# Patient Record
Sex: FEMALE | Race: WHITE | NOT HISPANIC OR LATINO | Employment: FULL TIME | URBAN - METROPOLITAN AREA
[De-identification: names, ages, dates, MRNs, and addresses within clinical notes are randomized per-mention and may not be internally consistent; named-entity substitution may affect disease eponyms.]

---

## 2017-06-20 ENCOUNTER — ALLSCRIPTS OFFICE VISIT (OUTPATIENT)
Dept: OTHER | Facility: OTHER | Age: 37
End: 2017-06-20

## 2017-06-20 DIAGNOSIS — R53.83 OTHER FATIGUE: ICD-10-CM

## 2017-06-20 DIAGNOSIS — M54.16 RADICULOPATHY OF LUMBAR REGION: ICD-10-CM

## 2017-06-20 DIAGNOSIS — M54.9 DORSALGIA: ICD-10-CM

## 2017-06-20 DIAGNOSIS — Z13.6 ENCOUNTER FOR SCREENING FOR CARDIOVASCULAR DISORDERS: ICD-10-CM

## 2017-07-25 ENCOUNTER — TRANSCRIBE ORDERS (OUTPATIENT)
Dept: ADMINISTRATIVE | Facility: HOSPITAL | Age: 37
End: 2017-07-25

## 2017-07-25 ENCOUNTER — APPOINTMENT (OUTPATIENT)
Dept: LAB | Facility: HOSPITAL | Age: 37
End: 2017-07-25
Payer: COMMERCIAL

## 2017-07-25 DIAGNOSIS — R53.83 OTHER FATIGUE: ICD-10-CM

## 2017-07-25 DIAGNOSIS — Z13.6 ENCOUNTER FOR SCREENING FOR CARDIOVASCULAR DISORDERS: ICD-10-CM

## 2017-07-25 LAB
ALBUMIN SERPL BCP-MCNC: 3.9 G/DL (ref 3.5–5)
ALP SERPL-CCNC: 74 U/L (ref 46–116)
ALT SERPL W P-5'-P-CCNC: 33 U/L (ref 12–78)
ANION GAP SERPL CALCULATED.3IONS-SCNC: 7 MMOL/L (ref 4–13)
AST SERPL W P-5'-P-CCNC: 16 U/L (ref 5–45)
BASOPHILS # BLD AUTO: 0.03 THOUSANDS/ΜL (ref 0–0.1)
BASOPHILS NFR BLD AUTO: 0 % (ref 0–1)
BILIRUB SERPL-MCNC: 0.6 MG/DL (ref 0.2–1)
BUN SERPL-MCNC: 13 MG/DL (ref 5–25)
CALCIUM SERPL-MCNC: 9 MG/DL (ref 8.3–10.1)
CHLORIDE SERPL-SCNC: 103 MMOL/L (ref 100–108)
CHOLEST SERPL-MCNC: 196 MG/DL (ref 50–200)
CO2 SERPL-SCNC: 30 MMOL/L (ref 21–32)
CREAT SERPL-MCNC: 0.78 MG/DL (ref 0.6–1.3)
EOSINOPHIL # BLD AUTO: 0.07 THOUSAND/ΜL (ref 0–0.61)
EOSINOPHIL NFR BLD AUTO: 1 % (ref 0–6)
ERYTHROCYTE [DISTWIDTH] IN BLOOD BY AUTOMATED COUNT: 12.8 % (ref 11.6–15.1)
GFR SERPL CREATININE-BSD FRML MDRD: 97 ML/MIN/1.73SQ M
GLUCOSE P FAST SERPL-MCNC: 97 MG/DL (ref 65–99)
HCT VFR BLD AUTO: 43.1 % (ref 34.8–46.1)
HDLC SERPL-MCNC: 57 MG/DL (ref 40–60)
HGB BLD-MCNC: 14.1 G/DL (ref 11.5–15.4)
LDLC SERPL CALC-MCNC: 116 MG/DL (ref 0–100)
LYMPHOCYTES # BLD AUTO: 2.02 THOUSANDS/ΜL (ref 0.6–4.47)
LYMPHOCYTES NFR BLD AUTO: 26 % (ref 14–44)
MCH RBC QN AUTO: 30.5 PG (ref 26.8–34.3)
MCHC RBC AUTO-ENTMCNC: 32.7 G/DL (ref 31.4–37.4)
MCV RBC AUTO: 93 FL (ref 82–98)
MONOCYTES # BLD AUTO: 0.45 THOUSAND/ΜL (ref 0.17–1.22)
MONOCYTES NFR BLD AUTO: 6 % (ref 4–12)
NEUTROPHILS # BLD AUTO: 5.18 THOUSANDS/ΜL (ref 1.85–7.62)
NEUTS SEG NFR BLD AUTO: 67 % (ref 43–75)
NRBC BLD AUTO-RTO: 0 /100 WBCS
PLATELET # BLD AUTO: 274 THOUSANDS/UL (ref 149–390)
PMV BLD AUTO: 10.4 FL (ref 8.9–12.7)
POTASSIUM SERPL-SCNC: 5.2 MMOL/L (ref 3.5–5.3)
PROT SERPL-MCNC: 7.1 G/DL (ref 6.4–8.2)
RBC # BLD AUTO: 4.63 MILLION/UL (ref 3.81–5.12)
SODIUM SERPL-SCNC: 140 MMOL/L (ref 136–145)
T4 FREE SERPL-MCNC: 0.99 NG/DL (ref 0.76–1.46)
TRIGL SERPL-MCNC: 114 MG/DL
TSH SERPL DL<=0.05 MIU/L-ACNC: 1.41 UIU/ML (ref 0.36–3.74)
WBC # BLD AUTO: 7.78 THOUSAND/UL (ref 4.31–10.16)

## 2017-07-25 PROCEDURE — 84439 ASSAY OF FREE THYROXINE: CPT

## 2017-07-25 PROCEDURE — 84443 ASSAY THYROID STIM HORMONE: CPT

## 2017-07-25 PROCEDURE — 80061 LIPID PANEL: CPT

## 2017-07-25 PROCEDURE — 36415 COLL VENOUS BLD VENIPUNCTURE: CPT

## 2017-07-25 PROCEDURE — 80053 COMPREHEN METABOLIC PANEL: CPT

## 2017-07-25 PROCEDURE — 85025 COMPLETE CBC W/AUTO DIFF WBC: CPT

## 2017-07-26 ENCOUNTER — ALLSCRIPTS OFFICE VISIT (OUTPATIENT)
Dept: OTHER | Facility: OTHER | Age: 37
End: 2017-07-26

## 2017-08-01 ENCOUNTER — GENERIC CONVERSION - ENCOUNTER (OUTPATIENT)
Dept: OTHER | Facility: OTHER | Age: 37
End: 2017-08-01

## 2017-08-28 ENCOUNTER — ALLSCRIPTS OFFICE VISIT (OUTPATIENT)
Dept: OTHER | Facility: OTHER | Age: 37
End: 2017-08-28

## 2017-10-06 ENCOUNTER — ALLSCRIPTS OFFICE VISIT (OUTPATIENT)
Dept: OTHER | Facility: OTHER | Age: 37
End: 2017-10-06

## 2017-10-06 ENCOUNTER — TRANSCRIBE ORDERS (OUTPATIENT)
Dept: ADMINISTRATIVE | Facility: HOSPITAL | Age: 37
End: 2017-10-06

## 2017-10-06 DIAGNOSIS — M54.50 LOW BACK PAIN WITHOUT SCIATICA, UNSPECIFIED BACK PAIN LATERALITY, UNSPECIFIED CHRONICITY: Primary | ICD-10-CM

## 2017-10-06 DIAGNOSIS — M54.16 RADICULOPATHY OF LUMBAR REGION: ICD-10-CM
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
## 2017-10-12 ENCOUNTER — HOSPITAL ENCOUNTER (OUTPATIENT)
Dept: MRI IMAGING | Facility: CLINIC | Age: 37
Discharge: HOME | End: 2017-10-12
Payer: COMMERCIAL

## 2017-10-12 DIAGNOSIS — M54.16 RADICULOPATHY OF LUMBAR REGION: ICD-10-CM

## 2017-10-12 PROCEDURE — 72148 MRI LUMBAR SPINE W/O DYE: CPT

## 2017-10-30 ENCOUNTER — GENERIC CONVERSION - ENCOUNTER (OUTPATIENT)
Dept: OTHER | Facility: OTHER | Age: 37
End: 2017-10-30

## 2017-11-08 ENCOUNTER — GENERIC CONVERSION - ENCOUNTER (OUTPATIENT)
Dept: OTHER | Facility: OTHER | Age: 37
End: 2017-11-08

## 2017-11-14 ENCOUNTER — ALLSCRIPTS OFFICE VISIT (OUTPATIENT)
Dept: RADIOLOGY | Facility: CLINIC | Age: 37
End: 2017-11-14
Payer: COMMERCIAL

## 2017-12-06 ENCOUNTER — GENERIC CONVERSION - ENCOUNTER (OUTPATIENT)
Dept: OTHER | Facility: OTHER | Age: 37
End: 2017-12-06

## 2018-01-11 NOTE — MISCELLANEOUS
Date: 08/22/2017   Dear Margarito Booker:     We have attempted to reach you regarding your upcoming appointment on September 11, 2017 and with Dr Kenyon Alexander     Unfortunately, due to a change in the provider's schedule we need to change your appointment  Please call our office as soon as possible so we can reschedule your appointment  We apologize for any inconvenience this may cause  Thank you in advance for your cooperation and assistance       Sincerely,   [facility]      Signatures   Electronically signed by : Tabitha Alvarez, ; Aug 22 2017  9:08AM EST                       (Author)

## 2018-01-12 VITALS
HEART RATE: 88 BPM | BODY MASS INDEX: 40.95 KG/M2 | HEIGHT: 63 IN | SYSTOLIC BLOOD PRESSURE: 102 MMHG | WEIGHT: 231.13 LBS | DIASTOLIC BLOOD PRESSURE: 80 MMHG | OXYGEN SATURATION: 97 %

## 2018-01-12 VITALS
RESPIRATION RATE: 72 BRPM | HEIGHT: 63 IN | WEIGHT: 239 LBS | DIASTOLIC BLOOD PRESSURE: 80 MMHG | SYSTOLIC BLOOD PRESSURE: 100 MMHG | OXYGEN SATURATION: 98 % | BODY MASS INDEX: 42.35 KG/M2

## 2018-01-13 VITALS
RESPIRATION RATE: 14 BRPM | WEIGHT: 227 LBS | DIASTOLIC BLOOD PRESSURE: 72 MMHG | BODY MASS INDEX: 40.22 KG/M2 | SYSTOLIC BLOOD PRESSURE: 114 MMHG | HEART RATE: 92 BPM | HEIGHT: 63 IN

## 2018-01-14 VITALS
OXYGEN SATURATION: 97 % | TEMPERATURE: 98.6 F | SYSTOLIC BLOOD PRESSURE: 120 MMHG | WEIGHT: 234 LBS | DIASTOLIC BLOOD PRESSURE: 72 MMHG | RESPIRATION RATE: 87 BRPM

## 2018-01-22 VITALS
HEIGHT: 63 IN | RESPIRATION RATE: 14 BRPM | DIASTOLIC BLOOD PRESSURE: 72 MMHG | SYSTOLIC BLOOD PRESSURE: 104 MMHG | BODY MASS INDEX: 40.75 KG/M2 | WEIGHT: 230 LBS | HEART RATE: 84 BPM

## 2018-01-22 VITALS
HEIGHT: 63 IN | DIASTOLIC BLOOD PRESSURE: 82 MMHG | BODY MASS INDEX: 40.04 KG/M2 | SYSTOLIC BLOOD PRESSURE: 128 MMHG | WEIGHT: 226 LBS

## 2018-01-23 NOTE — MISCELLANEOUS
Message   Recorded as Task   Date: 11/21/2017 08:54 AM, Created By: Facundo Vorenato   Task Name: Follow Up   Assigned To: [address] ,Team   Regarding Patient: Caio Patterson, Status: Active   Comment:    Facundo Valdez - 21 Nov 2017 8:54 AM     TASK CREATED  S/p R L4-L5 MBB #1 11/14  Pain diary needed  Facundo Gildarenato - 22 Nov 2017 10:19 AM     TASK IN PROGRESS   Patience Metzger - 22 Nov 2017 10:20 AM     TASK EDITED  lmom to Yulia Giang - 30 Nov 2017 1:10 PM     TASK EDITED  [facility] to Shauna Arauz - 05 Dec 2017 9:46 AM     TASK EDITED  Send can not reach letter   Dinesh Roman - 06 Dec 2017 9:11 AM     TASK EDITED  letter sent        Active Problems    1  Back pain, chronic (724 5,338 29) (M54 9,G89 29)   2  Candidal vulvovaginitis (112 1) (B37 3)   3  Lumbar degenerative disc disease (722 52) (M51 36)   4  Menorrhagia (626 2) (N92 0)   5  Morbid obesity with BMI of 40 0-44 9, adult (278 01,V85 41) (E66 01,Z68 41)   6  PMDD (premenstrual dysphoric disorder) (625 4) (F32 81)   7  Right lumbar radiculopathy (724 4) (M54 16)    Current Meds   1  BD Pen Needle Mini U/F 31G X 5 MM Miscellaneous; Patient injecting 0 6mg daily  Increasing by 0 6mg weekly if tolerated, not to exceed 3mg daily; Therapy: 54Jzz3271 to (Evaluate:76Cbu8602)  Requested for: 41Qjm7238; Last   Rx:01Aug2017 Ordered   2  Fluconazole 150 MG Oral Tablet; diflucan 150mg,,,take 1 today and repeat in 3 days; Therapy: 89BIX5053 to (Evaluate:16Nov2017)  Requested for: 20JXZ2239; Last   Rx:08Nov2017 Ordered   3  Saxenda 18 MG/3ML Subcutaneous Solution Pen-injector; INJECT 0 6 MG Daily for one   week, then increase dose by 0 6 mg/day weekly, no more than 3 mg   daily; Therapy: 91AEY0341 to (Dariusz Suggs)  Requested for: 93Mzg8495 Ordered   4  Sertraline HCl - 25 MG Oral Tablet (Zoloft); One tablet by mouth on days 14-28 of cycle;    Therapy: 04RGP3495 to (Nidia Schilling)  Requested for: 74RHF7140; Last   GN:30ISY2852 Ordered    Allergies    1  No Known Drug Allergies    2   Seasonal    Signatures   Electronically signed by : Sam Ramos, ; Dec  6 2017  9:11AM EST                       (Author)

## 2018-03-07 NOTE — PROCEDURES
Procedure    Indication: Mechanical low back pain  Preoperative diagnosis: 1  Lumbar Spondylosis      2  Low back pain  Postoperative diagnosis: 1  Lumbar Spondylosis   2  Low back pain    Procedure: Fluoroscopically-guided {RIGHT } L4-L5 Medial Branch Nerve/Dorsal Ramus Blocks using 2% lidocaine      After discussing the risks, benefits, and alternatives to the procedure, the patient expressed understanding and wished to proceed  The patient was brought to the fluoroscopy suite and placed in the prone position  Procedural pause conducted to verify: correct patient identity, procedure to be performed and as applicable, correct side and site, correct patient position, and availability of implants, special equipment and special requirements  Using fluoroscopy, the junction of the transverse process and superior articulating process of the {RIGHT } L5-5 and L5-S1 facet levels were identified  The junction between the sacral ala and the SAP was also identified in AP view  The skin was sterilely prepped and draped in the usual fashion using Chloraprep skin prep  Using fluoroscopic guidance, a 3 5 inch 25 gauge spinal needle was advanced to each target  A lateral view was obtained which showed the needles posterior to the foramen  After negative aspiration, 0 5cc of 2% lidocaine was injected at each site and the needles were then removed  The patient tolerated the procedure well and there were no apparent complications  After appropriate observation, the patient was dismissed from the clinic in good condition under their own power  The patient was instructed to keep a pain diary and report the results to our office            Signatures   Electronically signed by : Adonay Rahman MD; Nov 14 2017  4:34PM EST                       (Author)

## 2018-04-11 ENCOUNTER — OFFICE VISIT (OUTPATIENT)
Dept: INTERNAL MEDICINE CLINIC | Facility: CLINIC | Age: 38
End: 2018-04-11
Payer: COMMERCIAL

## 2018-04-11 VITALS
TEMPERATURE: 98.5 F | BODY MASS INDEX: 42.16 KG/M2 | HEART RATE: 96 BPM | DIASTOLIC BLOOD PRESSURE: 68 MMHG | WEIGHT: 238 LBS | OXYGEN SATURATION: 99 % | SYSTOLIC BLOOD PRESSURE: 122 MMHG

## 2018-04-11 DIAGNOSIS — J30.9 ALLERGIC RHINITIS, UNSPECIFIED SEASONALITY, UNSPECIFIED TRIGGER: ICD-10-CM

## 2018-04-11 DIAGNOSIS — J01.40 ACUTE PANSINUSITIS, RECURRENCE NOT SPECIFIED: Primary | ICD-10-CM

## 2018-04-11 PROCEDURE — 99213 OFFICE O/P EST LOW 20 MIN: CPT | Performed by: PHYSICIAN ASSISTANT

## 2018-04-11 RX ORDER — CEFUROXIME AXETIL 250 MG/1
250 TABLET ORAL EVERY 12 HOURS SCHEDULED
Qty: 20 TABLET | Refills: 0 | Status: SHIPPED | OUTPATIENT
Start: 2018-04-11 | End: 2018-04-21

## 2018-04-11 NOTE — PATIENT INSTRUCTIONS
Will start antibiotic due to the length of patient's symptoms  Also advised continuing allergy medication, and trial of OTC nasal spray such as Flonase, Rhinocort, or Nasacort

## 2018-04-11 NOTE — PROGRESS NOTES
Assessment/Plan:     Patient Instructions   Will start antibiotic due to the length of patient's symptoms  Also advised continuing allergy medication, and trial of OTC nasal spray such as Flonase, Rhinocort, or Nasacort  Followup scheduled per orders  Diagnoses and all orders for this visit:    Acute pansinusitis, recurrence not specified  -     cefuroxime (CEFTIN) 250 mg tablet; Take 1 tablet (250 mg total) by mouth every 12 (twelve) hours for 10 days    Allergic rhinitis, unspecified seasonality, unspecified trigger          Subjective:      Patient ID: Tamika Palencia is a 44 y o  female  Acute visit    Patient had been on a short vacation 2 weeks ago, at the end of the vacation she started with a sore throat and a cough  She started allergy medicine of Claritin daily and it seemed to help the cough to some degree  However as time progressed she developed increased postnasal drip, facial pressure and sinus pain along with decreased hearing of the right ear  The symptoms are not improving with the allergy medicines  At this time is not having typical allergy symptoms of itchy watery eyes, runny nose, sneezing, postnasal drip          ALLERGIES:  Allergies   Allergen Reactions    Seasonal Ic  [Cholestatin]        CURRENT MEDICATIONS:    Current Outpatient Prescriptions:     cefuroxime (CEFTIN) 250 mg tablet, Take 1 tablet (250 mg total) by mouth every 12 (twelve) hours for 10 days, Disp: 20 tablet, Rfl: 0    ACTIVE PROBLEM LIST:  Patient Active Problem List   Diagnosis    Acute pansinusitis       PAST MEDICAL HISTORY:  Past Medical History:   Diagnosis Date    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        PAST SURGICAL HISTORY:  Past Surgical History:   Procedure Laterality Date     SECTION      x3    INDUCED       by dilation and evacuation        FAMILY HISTORY:  Family History   Problem Relation Age of Onset    Anxiety disorder Mother     Depression Mother     Uterine cancer Mother     Hypertension Mother     Prostate cancer Father     Osteoporosis Maternal Grandmother     Breast cancer Paternal Grandmother     Alcohol abuse Paternal Grandfather     Schizophrenia Maternal Uncle     Depression Maternal Aunt     Bipolar disorder Maternal Aunt     Anxiety disorder Maternal Aunt     Other Maternal Aunt      epilepsy     Anxiety disorder Maternal Grandfather     Alcohol abuse Maternal Grandfather     Bipolar disorder Maternal Grandfather     Depression Maternal Grandfather        SOCIAL HISTORY:  Social History     Social History    Marital status: /Civil Union     Spouse name: N/A    Number of children: 3    Years of education: N/A     Occupational History    Full time employment - teacher       Social History Main Topics    Smoking status: Former Smoker    Smokeless tobacco: Never Used      Comment: 1/2-1 ppd x 10 years     Alcohol use Yes      Comment: socially - 1-2 glasses of wine weekly     Drug use: No    Sexual activity: Yes     Other Topics Concern    Not on file     Social History Narrative    Activities: Hiking & Reading     Brushes teeth twice a day     Dental care, regularly     Exercises regularly            Review of Systems   Constitutional: Negative for activity change, chills, fatigue and fever  HENT: Positive for congestion, ear pain, hearing loss, postnasal drip, rhinorrhea, sinus pain and sinus pressure  Eyes: Negative for discharge  Respiratory: Positive for cough  Negative for chest tightness, shortness of breath and wheezing  Cardiovascular: Negative for chest pain, palpitations and leg swelling  Gastrointestinal: Negative for abdominal pain, diarrhea, nausea and vomiting  Genitourinary: Negative for difficulty urinating  Musculoskeletal: Negative for arthralgias and myalgias  Skin: Negative for rash  Allergic/Immunologic: Negative for immunocompromised state     Neurological: Positive for headaches  Negative for dizziness, syncope, weakness and light-headedness  Hematological: Negative for adenopathy  Does not bruise/bleed easily  Psychiatric/Behavioral: Negative for dysphoric mood  The patient is not nervous/anxious  Objective:  Vitals:    04/11/18 1301   BP: 122/68   BP Location: Left arm   Patient Position: Sitting   Pulse: 96   Temp: 98 5 °F (36 9 °C)   SpO2: 99%   Weight: 108 kg (238 lb)        Physical Exam   Constitutional: She is oriented to person, place, and time  She appears well-developed and well-nourished  No distress  HENT:   Allergic shiners present, injected granular conjunctiva, pale boggy nasal mucosa with increased clear mucoid drainage, increased clear postnasal drainage in the posterior pharynx, tender both frontal and maxillary sinuses, right tympanic membrane slightly retracted with increased fluid behind   Neck: Neck supple  Cardiovascular: Normal rate, regular rhythm and normal heart sounds  Pulmonary/Chest: Effort normal and breath sounds normal  No respiratory distress  Musculoskeletal: She exhibits no edema  Lymphadenopathy:     She has no cervical adenopathy  Neurological: She is alert and oriented to person, place, and time  Skin: Skin is warm and dry  No rash noted  Psychiatric: She has a normal mood and affect  Her behavior is normal    Nursing note and vitals reviewed  RESULTS:    No results found for this or any previous visit (from the past 1008 hour(s))

## 2018-04-12 DIAGNOSIS — B37.3 VAGINAL YEAST INFECTION: Primary | ICD-10-CM

## 2018-04-12 RX ORDER — FLUCONAZOLE 150 MG/1
150 TABLET ORAL ONCE
Qty: 2 TABLET | Refills: 0 | Status: SHIPPED | OUTPATIENT
Start: 2018-04-12 | End: 2018-04-12

## 2018-04-12 NOTE — TELEPHONE ENCOUNTER
Pt called stating "I was on an antibiotic & now have a yeast inf "  Was last treated for yeast in November 2017 by [facility]  Per [facility] , ok to order diflucan  Diflucan 150mg po 1 dose now & 1 on day three ordered in epic  Pt advised to call pharmacy prior to picking up med

## 2018-05-18 ENCOUNTER — TELEPHONE (OUTPATIENT)
Dept: OBGYN CLINIC | Facility: CLINIC | Age: 38
End: 2018-05-18

## 2018-05-18 DIAGNOSIS — N39.0 URINARY TRACT INFECTION WITHOUT HEMATURIA, SITE UNSPECIFIED: Primary | ICD-10-CM

## 2018-05-18 RX ORDER — SULFAMETHOXAZOLE AND TRIMETHOPRIM 800; 160 MG/1; MG/1
1 TABLET ORAL EVERY 12 HOURS SCHEDULED
Qty: 6 TABLET | Refills: 0 | Status: SHIPPED | OUTPATIENT
Start: 2018-05-18 | End: 2018-05-21

## 2018-05-18 NOTE — TELEPHONE ENCOUNTER
Spoke with pt, c/o u/a freq and urgency   The patient has a history of in the past  Seen 02/2018 for yrly  nka  Order placed in epic for bactrim ds bid times 3 days  Pharm

## 2018-07-17 ENCOUNTER — TELEPHONE (OUTPATIENT)
Dept: INTERNAL MEDICINE CLINIC | Facility: CLINIC | Age: 38
End: 2018-07-17

## 2018-07-17 DIAGNOSIS — G47.9 SLEEP DISTURBANCE: Primary | ICD-10-CM

## 2018-07-17 PROBLEM — M54.9 BACK PAIN, CHRONIC: Status: ACTIVE | Noted: 2017-06-20

## 2018-07-17 PROBLEM — M51.369 LUMBAR DEGENERATIVE DISC DISEASE: Status: ACTIVE | Noted: 2017-10-30

## 2018-07-17 PROBLEM — M51.36 LUMBAR DEGENERATIVE DISC DISEASE: Status: ACTIVE | Noted: 2017-10-30

## 2018-07-17 PROBLEM — G89.29 BACK PAIN, CHRONIC: Status: ACTIVE | Noted: 2017-06-20

## 2018-07-23 ENCOUNTER — OFFICE VISIT (OUTPATIENT)
Dept: BARIATRICS | Facility: CLINIC | Age: 38
End: 2018-07-23
Payer: COMMERCIAL

## 2018-07-23 VITALS
TEMPERATURE: 98.6 F | HEART RATE: 92 BPM | RESPIRATION RATE: 16 BRPM | DIASTOLIC BLOOD PRESSURE: 68 MMHG | WEIGHT: 243.2 LBS | HEIGHT: 64 IN | BODY MASS INDEX: 41.52 KG/M2 | SYSTOLIC BLOOD PRESSURE: 126 MMHG

## 2018-07-23 DIAGNOSIS — M51.36 LUMBAR DEGENERATIVE DISC DISEASE: ICD-10-CM

## 2018-07-23 DIAGNOSIS — R63.5 ABNORMAL WEIGHT GAIN: ICD-10-CM

## 2018-07-23 DIAGNOSIS — E78.00 ELEVATED LDL CHOLESTEROL LEVEL: ICD-10-CM

## 2018-07-23 DIAGNOSIS — Z91.89 AT RISK FOR SLEEP APNEA: ICD-10-CM

## 2018-07-23 DIAGNOSIS — E66.01 OBESITY, CLASS III, BMI 40-49.9 (MORBID OBESITY) (HCC): Primary | ICD-10-CM

## 2018-07-23 PROBLEM — E66.813 OBESITY, CLASS III, BMI 40-49.9 (MORBID OBESITY): Status: ACTIVE | Noted: 2018-07-23

## 2018-07-23 PROBLEM — J01.40 ACUTE PANSINUSITIS: Status: RESOLVED | Noted: 2018-04-11 | Resolved: 2018-07-23

## 2018-07-23 PROCEDURE — 99204 OFFICE O/P NEW MOD 45 MIN: CPT | Performed by: PHYSICIAN ASSISTANT

## 2018-07-23 NOTE — PATIENT INSTRUCTIONS
Goals: Food log (ie ) www myfitnesspal com,sparkpeople  com,loseit com,calorieking  com,etc  baritastic  No sugary beverages  At least 80oz of water daily    Recommend checking lab coverage before having labs drawn

## 2018-07-23 NOTE — PROGRESS NOTES
Assessment/Plan:    Obesity, Class III, BMI 40-49 9 (morbid obesity) ([facility] )  -Discussed options of [facility]-Intensive Lifestyle Intervention Program, Very Low Calorie Diet-VLCD, Conservative Program, Katherine-En-Y Gastric Bypass and Vertical Sleeve Gastrectomy and the role of weight loss medications   -Initial weight loss goal of 5-10% weight loss for improved health  -Screening labs: ordered for after 7/25/18 as she had them done a year prior  Recommended she check insurance coverage  -Patient is interested in pursuing VLCD  If the [facility] location is open when she transition she would consider transitioning to [facility]    Lumbar degenerative disc disease  -sx may improve with weight loss    Follow up for VLCD start  Recommended continuing wearing sun block for continued skin CA prevention     Goals:  Food log (ie ) www myfitnesspal com,sparkpeople  com,loseit com,calorieking  com,etc  baritastic  No sugary beverages  At least 80oz of water daily  Recommend checking lab coverage before having labs drawn    Diagnoses and all orders for this visit:    Obesity, Class III, BMI 40-49 9 (morbid obesity) ([facility] )  -     Comprehensive metabolic panel; Future  -     TSH, 3rd generation with T4 reflex; Future  -     Lipid panel; Future  -     Insulin, fasting; Future    At risk for sleep apnea  Comments:  -has sleep study scheduled next week  Orders:  -     Comprehensive metabolic panel; Future  -     TSH, 3rd generation with T4 reflex; Future  -     Lipid panel; Future  -     Insulin, fasting; Future    Elevated LDL cholesterol level  Comments:  -lifestyle changes for now, recheck  Orders:  -     Comprehensive metabolic panel; Future  -     TSH, 3rd generation with T4 reflex; Future  -     Lipid panel; Future  -     Insulin, fasting; Future    Abnormal weight gain  -     Comprehensive metabolic panel; Future  -     TSH, 3rd generation with T4 reflex; Future  -     Lipid panel; Future  -     Insulin, fasting;  Future    Lumbar degenerative disc disease    Other orders  -     Naproxen Sodium (ALEVE PO); Take by mouth    Subjective:   Chief Complaint   Patient presents with   [address] patient here for MWM consult  Patient ID: Blanka Hensley  is a 44 y o  female with excess weight/obesity here to pursue weight management  Past Medical History:   Diagnosis Date    Back pain, chronic     Candidal vulvovaginitis     Fatigue     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     Lumbar degenerative disc disease     Menorrhagia     Morbid obesity (HCC)     PMDD (premenstrual dysphoric disorder)      HPI:  Obesity/Excess Weight:  Severity: Severe  Onset:  2008    Modifiers: Diet and Exercise, Commercial Weight Loss Programs-ie  Weight Watchers, [employer], Nutrisystem, etc  and [employer], most she ever lost was 20 lbs with Saxenda, which was discontinued due to side effects and minimal weight loss  Contributing factors: Insufficient Physical Activity, Pregnancy and overeating  Associated symptoms: fatigue and feels uncomfortable, hip pain    Goals: weigh 150 lbs  Hydration: at least 64 oz of water per day  Drinks unsweetened tea  Drinks 2 cups of coffee with milk  Alcohol: Drinks 3 mixed drinks/wine a few times per week  The following portions of the patient's history were reviewed and updated as appropriate: allergies, current medications, past family history, past medical history, past social history, past surgical history and problem list     Review of Systems   Constitutional: Negative for chills and fever  HENT: Negative for sore throat  Respiratory: Negative for cough and shortness of breath  Cardiovascular: Negative for chest pain and palpitations  Gastrointestinal: Positive for diarrhea (infrequent)  Negative for abdominal pain, constipation, nausea and vomiting  GERD is infrequent  Avoid triggers as discussed   Genitourinary: Negative for dysuria     Musculoskeletal: Positive for arthralgias (R hip)  Skin: Negative for rash  Neurological: Negative for headaches (denies hx of migraines, but does report sinus headaches)  Psychiatric/Behavioral: Negative for suicidal ideas (Denies HI)  Feeling down less than 1/2 of the week  Recommend seeing PCP ro counseling if sx persist or worsen     Objective:    /68 (BP Location: Right arm, Patient Position: Sitting, Cuff Size: Large)   Pulse 92   Temp 98 6 °F (37 °C) (Tympanic)   Resp 16   Ht 5' 3 75" (1 619 m)   Wt 110 kg (243 lb 3 2 oz)   BMI 42 07 kg/m²     Physical Exam   Nursing note and vitals reviewed  Constitutional   General appearance: Abnormal   well developed and morbidly obese  Eyes No conjunctival pallor  Ears, Nose, Mouth, and Throat Oral mucosa moist    Pulmonary   Respiratory effort: No increased work of breathing or signs of respiratory distress  Auscultation of lungs: Clear to auscultation, equal breath sounds bilaterally, no wheezes, no rales, no rhonci  Cardiovascular   Auscultation of heart: Normal rate and rhythm, normal S1 and S2, without murmurs  Examination of extremities for edema and/or varicosities: Normal   no edema  Abdomen   Abdomen: Abnormal   The abdomen was obese  Bowel sounds were normal  The abdomen was soft and nontender     Musculoskeletal   Gait and station: Normal     Psychiatric   Orientation to person, place and time: Normal     Affect: appropriate

## 2018-07-23 NOTE — ASSESSMENT & PLAN NOTE
-Discussed options of [facility]-Intensive Lifestyle Intervention Program, Very Low Calorie Diet-VLCD, Conservative Program, Katherine-En-Y Gastric Bypass and Vertical Sleeve Gastrectomy and the role of weight loss medications   -Initial weight loss goal of 5-10% weight loss for improved health  -Screening labs: ordered for after 7/25/18 as she had them done a year prior  Recommended she check insurance coverage  -Patient is interested in pursuing VLCD   If the [facility] location is open when she transition she would consider transitioning to [facility]

## 2018-07-30 ENCOUNTER — TELEPHONE (OUTPATIENT)
Dept: INTERNAL MEDICINE CLINIC | Facility: CLINIC | Age: 38
End: 2018-07-30

## 2018-07-30 DIAGNOSIS — E66.01 OBESITY, CLASS III, BMI 40-49.9 (MORBID OBESITY) (HCC): Primary | ICD-10-CM

## 2018-07-30 NOTE — TELEPHONE ENCOUNTER
Patient needs a doctor to doctor referral for weight loss  Patient is going to call back with the fax number for place   Can you write the referral?

## 2018-10-01 ENCOUNTER — TELEPHONE (OUTPATIENT)
Dept: INTERNAL MEDICINE CLINIC | Facility: CLINIC | Age: 38
End: 2018-10-01

## 2018-10-01 NOTE — TELEPHONE ENCOUNTER
Dorcus Quinton called in asking for a letter of support to have her gastric sleeve surgery on November 15

## 2018-10-08 NOTE — TELEPHONE ENCOUNTER
Letter is signed and in the folder upfront, called pt and left a message that it was here, or we could mail of fax it if need be

## 2019-11-19 ENCOUNTER — TELEPHONE (OUTPATIENT)
Dept: INTERNAL MEDICINE CLINIC | Facility: CLINIC | Age: 39
End: 2019-11-19

## 2020-06-03 ENCOUNTER — ANNUAL EXAM (OUTPATIENT)
Dept: OBGYN CLINIC | Facility: MEDICAL CENTER | Age: 40
End: 2020-06-03
Payer: COMMERCIAL

## 2020-06-03 VITALS — DIASTOLIC BLOOD PRESSURE: 70 MMHG | SYSTOLIC BLOOD PRESSURE: 130 MMHG | BODY MASS INDEX: 26.99 KG/M2 | WEIGHT: 156 LBS

## 2020-06-03 DIAGNOSIS — Z11.51 SCREENING FOR HPV (HUMAN PAPILLOMAVIRUS): ICD-10-CM

## 2020-06-03 DIAGNOSIS — Z12.31 ENCOUNTER FOR SCREENING MAMMOGRAM FOR MALIGNANT NEOPLASM OF BREAST: ICD-10-CM

## 2020-06-03 DIAGNOSIS — Z12.4 ENCOUNTER FOR PAPANICOLAOU SMEAR FOR CERVICAL CANCER SCREENING: Primary | ICD-10-CM

## 2020-06-03 DIAGNOSIS — Z01.419 ENCOUNTER FOR GYNECOLOGICAL EXAMINATION (GENERAL) (ROUTINE) WITHOUT ABNORMAL FINDINGS: ICD-10-CM

## 2020-06-03 PROCEDURE — S0612 ANNUAL GYNECOLOGICAL EXAMINA: HCPCS | Performed by: NURSE PRACTITIONER

## 2020-06-03 PROCEDURE — G0145 SCR C/V CYTO,THINLAYER,RESCR: HCPCS | Performed by: NURSE PRACTITIONER

## 2020-06-03 PROCEDURE — 87624 HPV HI-RISK TYP POOLED RSLT: CPT | Performed by: NURSE PRACTITIONER

## 2020-06-06 LAB
HPV HR 12 DNA CVX QL NAA+PROBE: NEGATIVE
HPV16 DNA CVX QL NAA+PROBE: NEGATIVE
HPV18 DNA CVX QL NAA+PROBE: NEGATIVE

## 2020-06-17 ENCOUNTER — HOSPITAL ENCOUNTER (OUTPATIENT)
Dept: RADIOLOGY | Facility: MEDICAL CENTER | Age: 40
Discharge: HOME | End: 2020-06-17
Payer: COMMERCIAL

## 2020-06-17 VITALS — BODY MASS INDEX: 26.29 KG/M2 | HEIGHT: 64 IN | WEIGHT: 154 LBS

## 2020-06-17 DIAGNOSIS — Z12.31 ENCOUNTER FOR SCREENING MAMMOGRAM FOR MALIGNANT NEOPLASM OF BREAST: ICD-10-CM

## 2020-06-17 PROCEDURE — 77067 SCR MAMMO BI INCL CAD: CPT

## 2020-06-17 PROCEDURE — 77063 BREAST TOMOSYNTHESIS BI: CPT

## 2020-06-18 LAB
LAB AP GYN PRIMARY INTERPRETATION: NORMAL
Lab: NORMAL

## 2020-07-02 ENCOUNTER — HOSPITAL ENCOUNTER (OUTPATIENT)
Dept: ULTRASOUND IMAGING | Facility: CLINIC | Age: 40
Discharge: HOME | End: 2020-07-02
Payer: COMMERCIAL

## 2020-07-02 ENCOUNTER — HOSPITAL ENCOUNTER (OUTPATIENT)
Dept: MAMMOGRAPHY | Facility: CLINIC | Age: 40
Discharge: HOME | End: 2020-07-02
Payer: COMMERCIAL

## 2020-07-02 VITALS — WEIGHT: 154 LBS | BODY MASS INDEX: 26.29 KG/M2 | HEIGHT: 64 IN

## 2020-07-02 DIAGNOSIS — R92.8 ABNORMAL MAMMOGRAM: ICD-10-CM

## 2020-07-02 PROCEDURE — G0279 TOMOSYNTHESIS, MAMMO: HCPCS

## 2020-07-02 PROCEDURE — 76642 ULTRASOUND BREAST LIMITED: CPT

## 2020-07-02 PROCEDURE — 77065 DX MAMMO INCL CAD UNI: CPT

## 2020-08-19 ENCOUNTER — OFFICE VISIT (OUTPATIENT)
Dept: INTERNAL MEDICINE CLINIC | Facility: CLINIC | Age: 40
End: 2020-08-19
Payer: COMMERCIAL

## 2020-08-19 VITALS
HEIGHT: 64 IN | BODY MASS INDEX: 26.8 KG/M2 | TEMPERATURE: 98.2 F | WEIGHT: 157 LBS | HEART RATE: 85 BPM | OXYGEN SATURATION: 98 % | DIASTOLIC BLOOD PRESSURE: 76 MMHG | SYSTOLIC BLOOD PRESSURE: 128 MMHG

## 2020-08-19 DIAGNOSIS — K43.2 INCISIONAL HERNIA, WITHOUT OBSTRUCTION OR GANGRENE: Primary | ICD-10-CM

## 2020-08-19 DIAGNOSIS — F41.9 ANXIETY: ICD-10-CM

## 2020-08-19 PROBLEM — E66.813 OBESITY, CLASS III, BMI 40-49.9 (MORBID OBESITY): Status: RESOLVED | Noted: 2018-07-23 | Resolved: 2020-08-19

## 2020-08-19 PROBLEM — E66.01 OBESITY, CLASS III, BMI 40-49.9 (MORBID OBESITY) (HCC): Status: RESOLVED | Noted: 2018-07-23 | Resolved: 2020-08-19

## 2020-08-19 PROCEDURE — 3008F BODY MASS INDEX DOCD: CPT | Performed by: PHYSICIAN ASSISTANT

## 2020-08-19 PROCEDURE — 1036F TOBACCO NON-USER: CPT | Performed by: PHYSICIAN ASSISTANT

## 2020-08-19 PROCEDURE — 3725F SCREEN DEPRESSION PERFORMED: CPT | Performed by: PHYSICIAN ASSISTANT

## 2020-08-19 PROCEDURE — 99214 OFFICE O/P EST MOD 30 MIN: CPT | Performed by: PHYSICIAN ASSISTANT

## 2020-08-19 RX ORDER — DULOXETIN HYDROCHLORIDE 20 MG/1
20 CAPSULE, DELAYED RELEASE ORAL DAILY
Qty: 30 CAPSULE | Refills: 1 | Status: SHIPPED | OUTPATIENT
Start: 2020-08-19 | End: 2022-04-04 | Stop reason: ALTCHOICE

## 2020-08-19 NOTE — PROGRESS NOTES
Assessment/Plan:      Patient Instructions   Will refer to surgery for incisional hernia  Will also start duloxetine (Cymbalta) 20 mg 1 daily for stress and anxiety symptoms  Will discuss effectiveness in 1 month, sooner as needed  Quality Measures: Patient has lost almost 100 lb secondary to bariatric surgery  Return in about 4 weeks (around 9/16/2020) for Next scheduled follow up  Diagnoses and all orders for this visit:    Incisional hernia, without obstruction or gangrene  Comments:  R side  Orders:  -     Ambulatory referral to General Surgery; Future    Anxiety  -     DULoxetine (CYMBALTA) 20 mg capsule; Take 1 capsule (20 mg total) by mouth daily          Subjective:      Patient ID: Fely Kraft is a 35 y o  female  Acute visit    Patient has noted for almost 1 year some increased pain in the right side of her abdomen  She has also recently noted that the area feels like there is a bulge present  She is status post bariatric surgery, and the area that is painful is where 1 of the laparoscopic scars is present  Denies any bowel or bladder dysfunction  No nausea or vomiting  No fever or chills  Patient also complaining of increased anxiety and stress feelings secondary to the pandemic and need to go back to work  She reports in her past she had been treated with Wellbutrin for this, not really sure whether not this was effective  Would like to consider another medication    We did discuss medications and are trying to keep her on something that would be weight neutral       ALLERGIES:  Allergies   Allergen Reactions    Other Allergic Rhinitis     Seasonal Allergies       CURRENT MEDICATIONS:    Current Outpatient Medications:     Naproxen Sodium (ALEVE PO), Take by mouth, Disp: , Rfl:     DULoxetine (CYMBALTA) 20 mg capsule, Take 1 capsule (20 mg total) by mouth daily, Disp: 30 capsule, Rfl: 1    ACTIVE PROBLEM LIST:  Patient Active Problem List   Diagnosis    Back pain, chronic    Lumbar degenerative disc disease    Sleep disturbance       PAST MEDICAL HISTORY:  Past Medical History:   Diagnosis Date    Back pain, chronic     Candidal vulvovaginitis     Fatigue     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     Lumbar degenerative disc disease     Menorrhagia     Morbid obesity (HCC)     Obesity, Class III, BMI 40-49 9 (morbid obesity) [facility]    PMDD (premenstrual dysphoric disorder)        PAST SURGICAL HISTORY:  Past Surgical History:   Procedure Laterality Date     SECTION      x3    GASTRIC RESTRICTION SURGERY      INDUCED       by dilation and evacuation     WISDOM TOOTH EXTRACTION         FAMILY HISTORY:  Family History   Problem Relation Age of Onset    Anxiety disorder Mother     Depression Mother     Uterine cancer Mother 64    Hypertension Mother     Prostate cancer Father     Osteoporosis Maternal Grandmother     Heart disease Maternal Grandmother     Stroke Maternal Grandmother     Breast cancer Paternal Grandmother 48    Alcohol abuse Paternal Grandfather     Lung cancer Paternal Grandfather     Schizophrenia Maternal Uncle     Depression Maternal Aunt     Bipolar disorder Maternal Aunt     Anxiety disorder Maternal Aunt     Other Maternal Aunt         epilepsy     Aneurysm Maternal Aunt         brain    Anxiety disorder Maternal Grandfather     Alcohol abuse Maternal Grandfather     Bipolar disorder Maternal Grandfather     Depression Maternal Grandfather     Heart disease Maternal Grandfather     Lung cancer Maternal Grandfather 79    Thyroid disease Paternal Aunt     Thyroid disease Cousin     Diabetes Neg Hx     Thyroid cancer Neg Hx        SOCIAL HISTORY:  Social History     Socioeconomic History    Marital status: /Civil Union     Spouse name: Not on file    Number of children: 3    Years of education: Not on file    Highest education level: Not on file   Occupational History    Occupation: Full time employment - teacher    Social Needs    Financial resource strain: Not on file    Food insecurity     Worry: Not on file     Inability: Not on file   [employer] needs     Medical: Not on file     Non-medical: Not on file   Tobacco Use    Smoking status: Former Smoker    Smokeless tobacco: Never Used    Tobacco comment: 1/2-1 ppd x 10 years    Substance and Sexual Activity    Alcohol use: Yes     Comment: socially - 1-2 glasses of wine weekly     Drug use: No    Sexual activity: Yes     Partners: Male     Birth control/protection: Female Sterilization   Lifestyle    Physical activity     Days per week: Not on file     Minutes per session: Not on file    Stress: Not on file   Relationships    Social connections     Talks on phone: Not on file     Gets together: Not on file     Attends Yarsani service: Not on file     Active member of club or organization: Not on file     Attends meetings of clubs or organizations: Not on file     Relationship status: Not on file    Intimate partner violence     Fear of current or ex partner: Not on file     Emotionally abused: Not on file     Physically abused: Not on file     Forced sexual activity: Not on file   Other Topics Concern    Not on file   Social History Narrative    Activities: Hiking & Reading     Brushes teeth twice a day     Dental care, regularly     Exercises regularly        Review of Systems   Constitutional: Negative for activity change, chills, fatigue and fever  HENT: Negative for congestion  Eyes: Negative for discharge  Respiratory: Negative for cough, chest tightness and shortness of breath  Cardiovascular: Negative for chest pain, palpitations and leg swelling  Gastrointestinal: Positive for abdominal pain  Negative for blood in stool, constipation, diarrhea, nausea and vomiting  Genitourinary: Negative for difficulty urinating  Musculoskeletal: Negative for arthralgias and myalgias  Skin: Negative for rash  Allergic/Immunologic: Negative for immunocompromised state  Neurological: Negative for dizziness, syncope, weakness, light-headedness and headaches  Hematological: Negative for adenopathy  Does not bruise/bleed easily  Psychiatric/Behavioral: Negative for dysphoric mood, sleep disturbance and suicidal ideas  The patient is not nervous/anxious  Objective:  Vitals:    08/19/20 1411   BP: 128/76   BP Location: Left arm   Patient Position: Sitting   Cuff Size: Adult   Pulse: 85   Temp: 98 2 °F (36 8 °C)   TempSrc: Temporal   SpO2: 98%   Weight: 71 2 kg (157 lb)   Height: 5' 4" (1 626 m)     Body mass index is 26 95 kg/m²  Physical Exam  Vitals signs and nursing note reviewed  Constitutional:       General: She is not in acute distress  Appearance: She is well-developed  HENT:      Head: Normocephalic and atraumatic  Eyes:      Pupils: Pupils are equal, round, and reactive to light  Neck:      Vascular: No carotid bruit or JVD  Cardiovascular:      Rate and Rhythm: Normal rate and regular rhythm  Heart sounds: Normal heart sounds  Pulmonary:      Effort: Pulmonary effort is normal       Breath sounds: Normal breath sounds  Abdominal:      General: Abdomen is flat  Bowel sounds are normal       Palpations: Abdomen is soft  Tenderness: There is abdominal tenderness  There is no right CVA tenderness or left CVA tenderness  Hernia: A hernia (Right abdominal wall defect, below scar from laparoscopic procedure  Area is tender  More tender on active flexion of the abdominal muscles ) is present  Musculoskeletal:      Right lower leg: No edema  Left lower leg: No edema  Lymphadenopathy:      Cervical: No cervical adenopathy  Skin:     General: Skin is warm and dry  Findings: No rash  Neurological:      General: No focal deficit present  Mental Status: She is alert and oriented to person, place, and time     Psychiatric: Mood and Affect: Mood normal          Behavior: Behavior normal            RESULTS:    No results found for this or any previous visit (from the past 1008 hour(s))  This note was created with voice recognition software  Phonic, grammatical and spelling errors may be present within the note as a result

## 2020-08-19 NOTE — PATIENT INSTRUCTIONS
Will refer to surgery for incisional hernia  Will also start duloxetine (Cymbalta) 20 mg 1 daily for stress and anxiety symptoms  Will discuss effectiveness in 1 month, sooner as needed

## 2020-09-14 ENCOUNTER — TELEPHONE (OUTPATIENT)
Dept: SURGERY | Facility: CLINIC | Age: 40
End: 2020-09-14

## 2020-09-17 ENCOUNTER — APPOINTMENT (OUTPATIENT)
Dept: LAB | Facility: HOSPITAL | Age: 40
End: 2020-09-17
Attending: SURGERY
Payer: COMMERCIAL

## 2020-09-17 ENCOUNTER — CONSULT (OUTPATIENT)
Dept: SURGERY | Facility: CLINIC | Age: 40
End: 2020-09-17
Payer: COMMERCIAL

## 2020-09-17 VITALS
SYSTOLIC BLOOD PRESSURE: 112 MMHG | TEMPERATURE: 98 F | BODY MASS INDEX: 26.63 KG/M2 | DIASTOLIC BLOOD PRESSURE: 62 MMHG | WEIGHT: 156 LBS | HEIGHT: 64 IN | HEART RATE: 79 BPM

## 2020-09-17 DIAGNOSIS — R19.01 ABDOMINAL WALL MASS OF RIGHT UPPER QUADRANT: ICD-10-CM

## 2020-09-17 DIAGNOSIS — R10.11 RIGHT UPPER QUADRANT ABDOMINAL PAIN: ICD-10-CM

## 2020-09-17 DIAGNOSIS — K43.2 INCISIONAL HERNIA, WITHOUT OBSTRUCTION OR GANGRENE: ICD-10-CM

## 2020-09-17 DIAGNOSIS — R10.11 RIGHT UPPER QUADRANT ABDOMINAL PAIN: Primary | ICD-10-CM

## 2020-09-17 LAB
B-HCG SERPL-ACNC: <2 MIU/ML
BUN SERPL-MCNC: 14 MG/DL (ref 5–25)
CREAT SERPL-MCNC: 0.73 MG/DL (ref 0.6–1.3)
GFR SERPL CREATININE-BSD FRML MDRD: 103 ML/MIN/1.73SQ M

## 2020-09-17 PROCEDURE — 36415 COLL VENOUS BLD VENIPUNCTURE: CPT

## 2020-09-17 PROCEDURE — 99244 OFF/OP CNSLTJ NEW/EST MOD 40: CPT | Performed by: SURGERY

## 2020-09-17 PROCEDURE — 82565 ASSAY OF CREATININE: CPT

## 2020-09-17 PROCEDURE — 84520 ASSAY OF UREA NITROGEN: CPT

## 2020-09-17 PROCEDURE — 84702 CHORIONIC GONADOTROPIN TEST: CPT

## 2020-09-17 NOTE — PROGRESS NOTES
Assessment/Plan:    No problem-specific Assessment & Plan notes found for this encounter  Subjective: Incisional Hernia     Patient ID: Della Parmar is a 35 y o  female  Objective: There were no vitals taken for this visit           Physical Exam

## 2020-09-17 NOTE — PROGRESS NOTES
Consult- General Surgery   Murali Hopkins 35 y o  female MRN: [id number]  Unit/Bed#:  Encounter: [id number]    Assessment/Plan     Assessment:  Abdominal wall mass in the right upper quadrant, rule out incisional hernia  Plan:  I will get a CT scan of the abdomen with p o  And IV contrast, further recommendations will be made pending on the results  History of Present Illness     HPI:  Murali Hopkins is a 35 y o  female who presents my office for evaluation of incisional hernia  The patient noticed a lump from prior incision after laparoscopic gastric sleep which was performed 2 years ago at [facility]   The lump has remained the same, she does have occasional discomfort, denies having any nausea, vomiting, diarrhea, constipation or any other constitutional symptoms  She attributed the lump to the incision May for the gastric sleeve  Review of Systems   Constitutional: Negative for chills and fever  HENT: Negative for nosebleeds and sore throat  Eyes: Negative for pain and discharge  Respiratory: Negative for cough and shortness of breath  Cardiovascular: Negative for chest pain and palpitations  Gastrointestinal:        As per history of present illness  Endocrine: Negative for cold intolerance and heat intolerance  Genitourinary: Negative for dysuria and hematuria  Neurological: Negative for seizures and headaches  Hematological: Negative for adenopathy  Does not bruise/bleed easily  Psychiatric/Behavioral: Negative for confusion  The patient is not nervous/anxious          Historical Information   Past Medical History:   Diagnosis Date    Back pain, chronic     Candidal vulvovaginitis     Fatigue     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     Lumbar degenerative disc disease     Menorrhagia     Morbid obesity (HCC)     Obesity, Class III, BMI 40-49 9 (morbid obesity) [facility] 7/23/2018    PMDD (premenstrual dysphoric disorder)      Past Surgical History:   Procedure Laterality Date     SECTION      x3    GASTRIC RESTRICTION SURGERY      INDUCED       by dilation and evacuation     WISDOM TOOTH EXTRACTION       Social History   Social History     Substance and Sexual Activity   Alcohol Use Yes    Comment: socially - 1-2 glasses of wine weekly      Social History     Substance and Sexual Activity   Drug Use No     Social History     Tobacco Use   Smoking Status Former Smoker   Smokeless Tobacco Never Used   Tobacco Comment    1/2-1 ppd x 10 years      Family History: non-contributory    Meds/Allergies   all medications and allergies reviewed     Current Outpatient Medications:     DULoxetine (CYMBALTA) 20 mg capsule, Take 1 capsule (20 mg total) by mouth daily, Disp: 30 capsule, Rfl: 1    Naproxen Sodium (ALEVE PO), Take by mouth, Disp: , Rfl:   Allergies   Allergen Reactions    Other Allergic Rhinitis     Seasonal Allergies       Objective     Current Vitals:   Blood Pressure: 112/62 (20 1224)  Pulse: 79 (20 1224)  Temperature: 98 °F (36 7 °C) (20 1224)  Temp Source: Temporal (20 1224)  Height: 5' 4" (162 6 cm) (20 1224)  Weight - Scale: 70 8 kg (156 lb) (20 1224)      Physical Exam  Vitals signs and nursing note reviewed  Constitutional:       General: She is not in acute distress  Cardiovascular:      Rate and Rhythm: Normal rate and regular rhythm  Heart sounds: No murmur  Pulmonary:      Effort: Pulmonary effort is normal  No respiratory distress  Breath sounds: Normal breath sounds  Abdominal:      Comments: Abdomen is soft, nondistended and nontender  On the supine position there is no obvious mass palpable or tenderness in the right upper quadrant  There is no visceromegaly or mass palpable  In the erect position there is the small lump on the right upper quadrant with tenderness to palpation without guarding or rebound  The lump is less than 1 cm      Skin:     Coloration: Skin is not jaundiced  Findings: No erythema or rash  Neurological:      Mental Status: She is alert and oriented to person, place, and time  Cranial Nerves: No cranial nerve deficit     Psychiatric:         Mood and Affect: Mood normal          Behavior: Behavior normal

## 2020-10-09 ENCOUNTER — HOSPITAL ENCOUNTER (OUTPATIENT)
Dept: CT IMAGING | Facility: HOSPITAL | Age: 40
Discharge: HOME | End: 2020-10-09
Attending: SURGERY
Payer: COMMERCIAL

## 2020-10-09 DIAGNOSIS — R19.01 ABDOMINAL WALL MASS OF RIGHT UPPER QUADRANT: ICD-10-CM

## 2020-10-09 DIAGNOSIS — R10.11 RIGHT UPPER QUADRANT ABDOMINAL PAIN: ICD-10-CM

## 2020-10-09 PROCEDURE — G1004 CDSM NDSC: HCPCS

## 2020-10-09 PROCEDURE — 74160 CT ABDOMEN W/CONTRAST: CPT

## 2020-10-09 RX ADMIN — IOHEXOL 100 ML: 350 INJECTION, SOLUTION INTRAVENOUS at 09:37

## 2020-10-16 ENCOUNTER — OFFICE VISIT (OUTPATIENT)
Dept: SURGERY | Facility: CLINIC | Age: 40
End: 2020-10-16
Payer: COMMERCIAL

## 2020-10-16 VITALS
TEMPERATURE: 96.9 F | DIASTOLIC BLOOD PRESSURE: 76 MMHG | HEART RATE: 90 BPM | WEIGHT: 159 LBS | HEIGHT: 64 IN | BODY MASS INDEX: 27.14 KG/M2 | SYSTOLIC BLOOD PRESSURE: 108 MMHG

## 2020-10-16 DIAGNOSIS — R10.11 RIGHT UPPER QUADRANT ABDOMINAL PAIN: Primary | ICD-10-CM

## 2020-10-16 PROCEDURE — 1036F TOBACCO NON-USER: CPT | Performed by: SURGERY

## 2020-10-16 PROCEDURE — 99213 OFFICE O/P EST LOW 20 MIN: CPT | Performed by: SURGERY

## 2020-10-16 RX ORDER — LORATADINE 10 MG/1
10 TABLET ORAL DAILY
COMMUNITY
End: 2022-04-04 | Stop reason: ALTCHOICE

## 2020-10-16 RX ORDER — OMEPRAZOLE 20 MG/1
20 CAPSULE, DELAYED RELEASE ORAL DAILY
COMMUNITY
End: 2022-04-04 | Stop reason: ALTCHOICE

## 2020-11-08 NOTE — TELEPHONE ENCOUNTER
Spoke with Donya Laboy she is still currently using us for her primary care she says she is unable to schedule her physical until january Patent

## 2021-08-13 ENCOUNTER — TELEMEDICINE (OUTPATIENT)
Dept: INTERNAL MEDICINE CLINIC | Facility: CLINIC | Age: 41
End: 2021-08-13
Payer: COMMERCIAL

## 2021-08-13 DIAGNOSIS — Z91.89 AT INCREASED RISK OF EXPOSURE TO COVID-19 VIRUS: Primary | ICD-10-CM

## 2021-08-13 PROCEDURE — 99213 OFFICE O/P EST LOW 20 MIN: CPT | Performed by: INTERNAL MEDICINE

## 2021-08-13 NOTE — PROGRESS NOTES
COVID-19 Outpatient Progress Note    Assessment/Plan:    Problem List Items Addressed This Visit     None      Visit Diagnoses     At increased risk of exposure to COVID-19 virus    -  Primary    Relevant Orders    Novel Coronavirus (COVID-19), PCR [facility] Collected in Office         Disposition:     I referred patient to one of our COVID-19 Respiratory Clinics  I referred patient to one of our centralized sites for a COVID-19 swab  I have spent 7 minutes directly with the patient  Greater than 50% of this time was spent in counseling/coordination of care regarding: risks and benefits of treatment options, instructions for management and patient and family education  Verification of patient location:    Patient is located in the following state in which I hold an active license [place]    Encounter provider Freda Grey MD    Provider located at [address] [place] [address]  [address] [facility]  [facility] [address]  [address] [facility] [address] [phone]    Recent Visits  No visits were found meeting these conditions  Showing recent visits within past 7 days and meeting all other requirements  Today's Visits  Date Type Provider Dept   08/13/21 Maggi Duong MD [facility] [address] today's visits and meeting all other requirements  Future Appointments  No visits were found meeting these conditions  Showing future appointments within next 150 days and meeting all other requirements     This virtual check-in was done via [employer] and patient was informed that this is a secure, HIPAA-compliant platform  She agrees to proceed  Patient agrees to participate in a virtual check in via telephone or video visit instead of presenting to the office to address urgent/immediate medical needs  Patient is aware this is a billable service  After connecting through [facility], the patient was identified by name and date of birth   Tu Cordova was informed that this was a telemedicine visit and that the exam was being conducted confidentially over secure lines  My office door was closed  No one else was in the room  Marsha Ely acknowledged consent and understanding of privacy and security of the telemedicine visit  I informed the patient that I have reviewed her record in Epic and presented the opportunity for her to ask any questions regarding the visit today  The patient agreed to participate  Subjective:   Marsha Ely is a 38 y o  female who is concerned about COVID-19  Patient's symptoms include nasal congestion       Date of symptom onset: 8/10/2021    Exposure:   Contact with a person who is under investigation (PUI) for or who is positive for COVID-19 within the last 14 days?: Yes    Hospitalized recently for fever and/or lower respiratory symptoms?: No      Currently a healthcare worker that is involved in direct patient care?: No      Works in a special setting where the risk of COVID-19 transmission may be high? (this may include long-term care, correctional and halfway facilities; homeless shelters; assisted-living facilities and group homes ): No      Resident in a special setting where the risk of COVID-19 transmission may be high? 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     Lumbar degenerative disc disease     Menorrhagia     Morbid obesity ([address] )     Obesity, Class III, BMI 40-49 9 (morbid obesity) ([address] ) 2018    PMDD (premenstrual dysphoric disorder)      Past Surgical History:   Procedure Laterality Date     SECTION      x3    GASTRIC RESTRICTION SURGERY      INDUCED       by dilation and evacuation     WISDOM TOOTH EXTRACTION       Current Outpatient Medications   Medication Sig Dispense Refill    DULoxetine (CYMBALTA) 20 mg capsule Take 1 capsule (20 mg total) by mouth daily (Patient not taking: Reported on 10/16/2020) 30 capsule 1    loratadine (CLARITIN) 10 mg tablet Take 10 mg by mouth daily      Naproxen Sodium (ALEVE PO) Take by mouth      omeprazole (PriLOSEC) 20 mg delayed release capsule Take 20 mg by mouth daily       No current facility-administered medications for this visit  Allergies   Allergen Reactions    Other Allergic Rhinitis     Seasonal Allergies       Review of Systems   HENT: Positive for congestion  All other systems reviewed and are negative  Objective: There were no vitals filed for this visit  Physical Exam  Vitals and nursing note reviewed  Constitutional:       Appearance: Normal appearance  Pulmonary:      Effort: Pulmonary effort is normal    Neurological:      Mental Status: She is alert and oriented to person, place, and time  Psychiatric:         Mood and Affect: Mood normal          Behavior: Behavior normal          Thought Content: Thought content normal          Judgment: Judgment normal          VIRTUAL VISIT DISCLAIMER    Imeldavivian Pete verbally agrees to participate in [employer]  Pt is aware that [employer] could be limited without vital signs or the ability to perform a full hands-on physical Francisco Cruz understands she or the provider may request at any time to terminate the video visit and request the patient to seek care or treatment in person

## 2022-01-28 ENCOUNTER — OFFICE VISIT (OUTPATIENT)
Dept: URGENT CARE | Facility: CLINIC | Age: 42
End: 2022-01-28
Payer: COMMERCIAL

## 2022-01-28 VITALS
BODY MASS INDEX: 27.46 KG/M2 | WEIGHT: 160 LBS | HEART RATE: 85 BPM | OXYGEN SATURATION: 99 % | RESPIRATION RATE: 19 BRPM | DIASTOLIC BLOOD PRESSURE: 68 MMHG | SYSTOLIC BLOOD PRESSURE: 123 MMHG | TEMPERATURE: 98.1 F

## 2022-01-28 DIAGNOSIS — J02.9 ACUTE VIRAL PHARYNGITIS: Primary | ICD-10-CM

## 2022-01-28 LAB — S PYO AG THROAT QL: NEGATIVE

## 2022-01-28 PROCEDURE — 87070 CULTURE OTHR SPECIMN AEROBIC: CPT

## 2022-01-28 PROCEDURE — 99213 OFFICE O/P EST LOW 20 MIN: CPT

## 2022-01-28 PROCEDURE — 87147 CULTURE TYPE IMMUNOLOGIC: CPT

## 2022-01-28 NOTE — PATIENT INSTRUCTIONS
Continue supportive care - keep fluid intake up and take tylenol and ibuprofen as needed for symptoms  Follow up with PCP in 3-5 days  Proceed to  ER if symptoms worsen  Pharyngitis, Ambulatory Care   GENERAL INFORMATION:   Pharyngitis  is swelling of the inside of your throat, called the pharynx  Pharyngitis is often caused by a cold or flu virus  It may also be caused by bacteria such as strep  Other causes include smoking, a runny nose, allergies, or acid reflux  Common symptoms include the following:   · Sore throat or pain when you swallow    · Fever, chills, and body aches    · Hoarse or raspy voice    · Cough, runny or stuffy nose, itchy or watery eyes    · Headache    · Upset stomach and loss of appetite    · Mild neck stiffness    · Swollen glands that feel like hard lumps when you touch your neck    · White and yellow pus-filled blisters in the back of your throat  Seek immediate care for the following symptoms:   · A painful lump in your throat that does not go away after 5 days    · Blood in your throat or ear    · Fever higher than 102? F (39?C) or lasts longer than 3 days    · Confusion    · Trouble breathing or swallowing  Treatment for pharyngitis  may include antibiotics if your sore throat is caused by bacteria  Viral pharyngitis will go away on its own without treatment  Your sore throat should start to feel better within 3 to 5 days for both viral and bacterial infections  Pain medicine may also be given to decrease your sore throat pain  Manage your symptoms:   · Gargle with salt water  to help reduce swelling in your throat  Mix ¼ teaspoon salt with 1 cup of warm water and gargle  · Drink more liquids  to help prevent dehydration  Cold or warm drinks may help soothe your throat  · Humidify your room  with a cool-steam humidifier to help moisten the air in your room and calm your cough  · Soothe your throat  with cough drops, ice, soft foods, or popsicles      · Rest your throat as much as possible  Try not to use your voice to decrease throat irritation  Prevent the spread of germs  by washing your hands with soap and warm water  Pharyngitis spreads easily from one person to another  Do not share food or drinks  Follow up with your healthcare provider as directed:  Write down your questions so you remember to ask them during your visits  CARE AGREEMENT:   You have the right to help plan your care  Learn about your health condition and how it may be treated  Discuss treatment options with your caregivers to decide what care you want to receive  You always have the right to refuse treatment  The above information is an  only  It is not intended as medical advice for individual conditions or treatments  Talk to your doctor, nurse or pharmacist before following any medical regimen to see if it is safe and effective for you  © 2014 [address] is for End User's use only and may not be sold, redistributed or otherwise used for commercial purposes  All illustrations and images included in CareNotes® are the copyrighted property of [employer]  or Barry Rankin

## 2022-01-28 NOTE — PROGRESS NOTES
[address] [email or web address] [facility]    NAME: Libertad Darnell is a 42 y o  female  : 1980    MRN: [id number]  DATE: 2022  TIME: 4:18 PM    Assessment and Plan   Acute viral pharyngitis [J02 9]  1  Acute viral pharyngitis  POCT rapid strepA    Throat culture     Complains of sore throat for 4 days  In office strep A negative, will sent for culture  Suspect viral infection  COVID was negative from [facility] 3 days ago  Continue supportive care for symptoms  Patient Instructions     Continue supportive care - keep fluid intake up and take tylenol and ibuprofen as needed for symptoms  Follow up with PCP in 3-5 days  Proceed to ER if symptoms worsen  Chief Complaint     Chief Complaint   Patient presents with    Sore Throat     pt c/o of sore throat, hurts to swallow x4 days         History of Present Illness       Complains of sore throat that began 4 days ago  Did COVID test on Tuesday, 3 days ago which came back negative  It hurts to swallow  Able to tolerate fluids and solids  Review of Systems   Review of Systems   Constitutional: Negative for chills, fatigue and fever  HENT: Positive for congestion and sore throat  Negative for ear pain  Respiratory: Positive for cough and shortness of breath (intermittent)  Cardiovascular: Negative for chest pain  Gastrointestinal: Negative for abdominal distention, constipation, diarrhea, nausea and vomiting  Genitourinary: Negative for dysuria  Neurological: Negative for headaches  Psychiatric/Behavioral: Negative for confusion           Current Medications       Current Outpatient Medications:     DULoxetine (CYMBALTA) 20 mg capsule, Take 1 capsule (20 mg total) by mouth daily (Patient not taking: Reported on 10/16/2020), Disp: 30 capsule, Rfl: 1    loratadine (CLARITIN) 10 mg tablet, Take 10 mg by mouth daily, Disp: , Rfl:     Naproxen Sodium (ALEVE PO), Take by mouth, Disp: , Rfl:     omeprazole (PriLOSEC) 20 mg delayed release capsule, Take 20 mg by mouth daily (Patient not taking: Reported on 2022 ), Disp: , Rfl:     Current Allergies     Allergies as of 2022 - Reviewed 2022   Allergen Reaction Noted    Other Allergic Rhinitis 2018            The following portions of the patient's history were reviewed and updated as appropriate: allergies, current medications, past family history, past medical history, past social history, past surgical history and problem list      Past Medical History:   Diagnosis Date    Back pain, chronic     Candidal vulvovaginitis     Fatigue     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     Lumbar degenerative disc disease     Menorrhagia     [facility]     Obesity, Class III, BMI 40-49 9 (morbid obesity) ([facility] ) 2018    PMDD (premenstrual dysphoric disorder)        Past Surgical History:   Procedure Laterality Date     SECTION      x3    GASTRIC RESTRICTION SURGERY      INDUCED       by dilation and evacuation     WISDOM TOOTH EXTRACTION         Family History   Problem Relation Age of Onset    Anxiety disorder Mother     Depression Mother     Uterine cancer Mother 64    Hypertension Mother     Prostate cancer Father     Osteoporosis Maternal Grandmother     Heart disease Maternal Grandmother     Stroke Maternal Grandmother     Breast cancer Paternal Grandmother 48    Alcohol abuse Paternal Grandfather     Lung cancer Paternal Grandfather     Schizophrenia Maternal Uncle     Depression Maternal Aunt     Bipolar disorder Maternal Aunt     Anxiety disorder Maternal Aunt     Other Maternal Aunt         epilepsy     Aneurysm Maternal Aunt         brain    Anxiety disorder Maternal Grandfather     Alcohol abuse Maternal Grandfather     Bipolar disorder Maternal Grandfather     Depression Maternal Grandfather     Heart disease Maternal Grandfather     Lung cancer Maternal Grandfather 79    Thyroid disease Paternal Aunt     Thyroid disease Cousin     Diabetes Neg Hx     Thyroid cancer Neg Hx          Medications have been verified  Objective   /68   Pulse 85   Temp 98 1 °F (36 7 °C)   Resp 19   Wt 72 6 kg (160 lb)   SpO2 99%   BMI 27 46 kg/m²        Physical Exam     Physical Exam  Vitals reviewed  Constitutional:       Appearance: She is well-developed  HENT:      Right Ear: Tympanic membrane and ear canal normal  Tympanic membrane is not erythematous  Left Ear: Tympanic membrane and ear canal normal  Tympanic membrane is not erythematous  Mouth/Throat:      Mouth: Mucous membranes are moist       Pharynx: Posterior oropharyngeal erythema present  No pharyngeal swelling or oropharyngeal exudate  Tonsils: No tonsillar exudate or tonsillar abscesses  0 on the right  0 on the left  Cardiovascular:      Rate and Rhythm: Normal rate and regular rhythm  Heart sounds: Normal heart sounds  No murmur heard  Pulmonary:      Effort: Pulmonary effort is normal  No respiratory distress  Breath sounds: Normal breath sounds  No wheezing  Chest:      Chest wall: No tenderness  Abdominal:      General: Bowel sounds are normal       Palpations: Abdomen is soft  Musculoskeletal:      Cervical back: Normal range of motion  Skin:     General: Skin is warm  Capillary Refill: Capillary refill takes less than 2 seconds  Neurological:      Mental Status: She is alert and oriented to person, place, and time     Psychiatric:         Mood and Affect: Mood normal          Behavior: Behavior normal

## 2022-01-31 LAB — BACTERIA THROAT CULT: ABNORMAL

## 2022-02-01 ENCOUNTER — TELEPHONE (OUTPATIENT)
Dept: URGENT CARE | Facility: CLINIC | Age: 42
End: 2022-02-01

## 2022-02-01 DIAGNOSIS — A49.1 STREPTOCOCCUS PNEUMONIAE: Primary | ICD-10-CM

## 2022-02-01 RX ORDER — AMOXICILLIN 500 MG/1
500 CAPSULE ORAL EVERY 8 HOURS SCHEDULED
Qty: 30 CAPSULE | Refills: 0 | Status: SHIPPED | OUTPATIENT
Start: 2022-02-01 | End: 2022-02-11

## 2022-02-01 NOTE — TELEPHONE ENCOUNTER
Called pt and left a message regarding Strep culture results  She called back  She continues to have mild symptoms of a sore throat including esophagus pain  Antibiotic will be sent in  Clarified no antibiotic allergies

## 2022-04-04 ENCOUNTER — OFFICE VISIT (OUTPATIENT)
Dept: INTERNAL MEDICINE CLINIC | Facility: CLINIC | Age: 42
End: 2022-04-04
Payer: COMMERCIAL

## 2022-04-04 VITALS
TEMPERATURE: 98 F | HEIGHT: 64 IN | SYSTOLIC BLOOD PRESSURE: 104 MMHG | DIASTOLIC BLOOD PRESSURE: 62 MMHG | HEART RATE: 93 BPM | WEIGHT: 166 LBS | OXYGEN SATURATION: 99 % | RESPIRATION RATE: 16 BRPM | BODY MASS INDEX: 28.34 KG/M2

## 2022-04-04 DIAGNOSIS — M54.50 ACUTE BILATERAL LOW BACK PAIN WITHOUT SCIATICA: Primary | ICD-10-CM

## 2022-04-04 DIAGNOSIS — M77.11 RIGHT LATERAL EPICONDYLITIS: ICD-10-CM

## 2022-04-04 DIAGNOSIS — M77.01 MEDIAL EPICONDYLITIS OF ELBOW, RIGHT: ICD-10-CM

## 2022-04-04 PROCEDURE — 1036F TOBACCO NON-USER: CPT | Performed by: PHYSICIAN ASSISTANT

## 2022-04-04 PROCEDURE — 3725F SCREEN DEPRESSION PERFORMED: CPT | Performed by: PHYSICIAN ASSISTANT

## 2022-04-04 PROCEDURE — 99214 OFFICE O/P EST MOD 30 MIN: CPT | Performed by: PHYSICIAN ASSISTANT

## 2022-04-04 PROCEDURE — 3008F BODY MASS INDEX DOCD: CPT | Performed by: PHYSICIAN ASSISTANT

## 2022-04-04 RX ORDER — METHYLPREDNISOLONE 4 MG/1
TABLET ORAL
Qty: 21 TABLET | Refills: 0 | Status: SHIPPED | OUTPATIENT
Start: 2022-04-04 | End: 2022-06-16 | Stop reason: HOSPADM

## 2022-04-04 RX ORDER — CYCLOBENZAPRINE HCL 5 MG
5 TABLET ORAL
Qty: 20 TABLET | Refills: 1 | Status: SHIPPED | OUTPATIENT
Start: 2022-04-04 | End: 2022-06-16 | Stop reason: ALTCHOICE

## 2022-04-04 NOTE — PATIENT INSTRUCTIONS
For acute low back pain, and ongoing right elbow pain will give trial of Medrol Ayaan for anti-inflammatory  For low back muscle spasm will make cyclobenzaprine 5 mg available to take 1 at bedtime  Can also use topical ice pack to low back intermittently for the next 2 days then start heat   For right elbow pain obtain an lateral epicondyle band and use as instructed  Keep me informed as to how you are doing with both these ongoing problems  Can also try OTC flaxseed oil caplets 1 daily for tendinitis

## 2022-04-04 NOTE — PROGRESS NOTES
Assessment/Plan:   Patient Instructions   For acute low back pain, and ongoing right elbow pain will give trial of Medrol Ayaan for anti-inflammatory  For low back muscle spasm will make cyclobenzaprine 5 mg available to take 1 at bedtime  Can also use topical ice pack to low back intermittently for the next 2 days then start heat   For right elbow pain obtain an lateral epicondyle band and use as instructed  Keep me informed as to how you are doing with both these ongoing problems  Can also try OTC flaxseed oil caplets 1 daily for tendinitis  Quality Measures: BMI Counseling: Body mass index is 28 49 kg/m²  The BMI is above normal  Nutrition recommendations include decreasing portion sizes, encouraging healthy choices of fruits and vegetables, consuming healthier snacks, moderation in carbohydrate intake and reducing intake of cholesterol  Exercise recommendations include exercising 3-5 times per week  Rationale for BMI follow-up plan is due to patient being overweight or obese  Depression Screening and Follow-up Plan: Patient was screened for depression during today's encounter  They screened negative with a PHQ-2 score of 0  Return if symptoms worsen or fail to improve  Diagnoses and all orders for this visit:    Acute bilateral low back pain without sciatica  -     cyclobenzaprine (FLEXERIL) 5 mg tablet; Take 1 tablet (5 mg total) by mouth daily at bedtime  -     methylPREDNISolone 4 MG tablet therapy pack; Use as directed on package    Right lateral epicondylitis  -     methylPREDNISolone 4 MG tablet therapy pack; Use as directed on package    Medial epicondylitis of elbow, right  -     methylPREDNISolone 4 MG tablet therapy pack; Use as directed on package          Subjective:      Patient ID: Linus Marin is a 42 y o  female  Acute visit    Several months ago patient started working out and had been lifting weights    She started developing pain in her right elbow which is now been present for 2 months  She states it hurts on certain movements such as pronation, supination and certain movements of the wrist   It is not improved with over-the-counter ibuprofen, Aleve or topical anti-inflammatories  Patient also over the past 1 to do days has developed low back pain  She has a history of chronic right hip pain and states this has been bothersome for the past several weeks to the point she is walking funny, she believes this throughout her back, as she has been having low back pain across her lower back without any radiation        ALLERGIES:  Allergies   Allergen Reactions    Other Allergic Rhinitis     Seasonal Allergies       CURRENT MEDICATIONS:    Current Outpatient Medications:     cyclobenzaprine (FLEXERIL) 5 mg tablet, Take 1 tablet (5 mg total) by mouth daily at bedtime, Disp: 20 tablet, Rfl: 1    methylPREDNISolone 4 MG tablet therapy pack, Use as directed on package, Disp: 21 tablet, Rfl: 0    ACTIVE PROBLEM LIST:  Patient Active Problem List   Diagnosis    Back pain, chronic    Lumbar degenerative disc disease    Sleep disturbance       PAST MEDICAL HISTORY:  Past Medical History:   Diagnosis Date    Back pain, chronic     Candidal vulvovaginitis     Fatigue     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     Lumbar degenerative disc disease     Menorrhagia     Morbid obesity ([facility])     Obesity, Class III, BMI 40-49 9 (morbid obesity) ([facility]) 2018    PMDD (premenstrual dysphoric disorder)        PAST SURGICAL HISTORY:  Past Surgical History:   Procedure Laterality Date     SECTION      x3    GASTRIC RESTRICTION SURGERY      INDUCED       by dilation and evacuation     WISDOM TOOTH EXTRACTION         FAMILY HISTORY:  Family History   Problem Relation Age of Onset    Anxiety disorder Mother     Depression Mother     Uterine cancer Mother 64    Hypertension Mother     Prostate cancer Father     Osteoporosis Maternal Grandmother     Heart disease Maternal Grandmother     Stroke Maternal Grandmother     Breast cancer Paternal Grandmother 48    Alcohol abuse Paternal Grandfather     Lung cancer Paternal Grandfather     Schizophrenia Maternal Uncle     Depression Maternal Aunt     Bipolar disorder Maternal Aunt     Anxiety disorder Maternal Aunt     Other Maternal Aunt         epilepsy     Aneurysm Maternal Aunt         brain    Anxiety disorder Maternal Grandfather     Alcohol abuse Maternal Grandfather     Bipolar disorder Maternal Grandfather     Depression Maternal Grandfather     Heart disease Maternal Grandfather     Lung cancer Maternal Grandfather 79    Thyroid disease Paternal Aunt     Thyroid disease Cousin     Diabetes Neg Hx     Thyroid cancer Neg Hx        SOCIAL HISTORY:  Social History     Socioeconomic History    Marital status: /Civil Union     Spouse name: Not on file    Number of children: 3    Years of education: Not on file    Highest education level: Not on file   Occupational History    Occupation: Full time employment - teacher    Tobacco Use    Smoking status: Former Smoker    Smokeless tobacco: Never Used    Tobacco comment: 1/2-1 ppd x 10 years    Vaping Use    Vaping Use: Never used   Substance and Sexual Activity    Alcohol use: Yes     Comment: socially - 1-2 glasses of wine weekly     Drug use: No    Sexual activity: Yes     Partners: Male     Birth control/protection: Female Sterilization   Other Topics Concern    Not on file   Social History Narrative    Activities: Hiking & Reading     Brushes teeth twice a day     Dental care, regularly     Exercises regularly      Social Determinants of Health     Financial Resource Strain: Not on file   Food Insecurity: Not on file   Transportation Needs: Not on file   Physical Activity: Not on file   Stress: Not on file   Social Connections: Not on file   Intimate Partner Violence: Not on file   Housing Stability: Not on file       Review of Systems   Constitutional: Negative for activity change, chills, fatigue and fever  HENT: Negative for congestion  Eyes: Negative for discharge  Respiratory: Negative for cough, chest tightness and shortness of breath  Cardiovascular: Negative for chest pain, palpitations and leg swelling  Gastrointestinal: Negative for abdominal pain  Genitourinary: Negative for difficulty urinating  Musculoskeletal: Positive for arthralgias and back pain  Negative for myalgias  Skin: Negative for rash  Allergic/Immunologic: Negative for immunocompromised state  Neurological: Negative for dizziness, syncope, weakness, light-headedness and headaches  Hematological: Negative for adenopathy  Does not bruise/bleed easily  Psychiatric/Behavioral: Negative for dysphoric mood  The patient is not nervous/anxious  Objective:  Vitals:    04/04/22 1533   BP: 104/62   BP Location: Left arm   Patient Position: Sitting   Cuff Size: Standard   Pulse: 93   Resp: 16   Temp: 98 °F (36 7 °C)   TempSrc: Tympanic   SpO2: 99%   Weight: 75 3 kg (166 lb)   Height: 5' 4" (1 626 m)     Body mass index is 28 49 kg/m²  Physical Exam  Vitals and nursing note reviewed  Constitutional:       General: She is not in acute distress  Appearance: She is well-developed  HENT:      Head: Normocephalic and atraumatic  Eyes:      Extraocular Movements: Extraocular movements intact  Pupils: Pupils are equal, round, and reactive to light  Pulmonary:      Effort: No respiratory distress  Musculoskeletal:      Right lower leg: No edema  Left lower leg: No edema  Comments: Palpable tenderness of both medial and lateral epicondyles of right elbow  Tenderness on pronation and supination  Tenderness on extension of wrist and full flexion of wrist   Patient can fully flex and extend the elbow  No neurovascular compromise      Back exam:  Palpable tenderness with spasm of bilateral parasacral area   Negative straight leg raising  Strength and reflexes equal   Tenderness on active bilateral hip flexion, and internal / external rotation of hips  Skin:     General: Skin is warm and dry  Findings: No rash  Neurological:      General: No focal deficit present  Mental Status: She is alert and oriented to person, place, and time  Mental status is at baseline  Psychiatric:         Mood and Affect: Mood normal          Behavior: Behavior normal            RESULTS:    No results found for this or any previous visit (from the past 1008 hour(s))  This note was created with voice recognition software  Phonic, grammatical and spelling errors may be present within the note as a result

## 2022-06-16 ENCOUNTER — ANNUAL EXAM (OUTPATIENT)
Dept: OBGYN CLINIC | Age: 42
End: 2022-06-16
Payer: COMMERCIAL

## 2022-06-16 VITALS
HEIGHT: 64 IN | SYSTOLIC BLOOD PRESSURE: 112 MMHG | BODY MASS INDEX: 27.96 KG/M2 | WEIGHT: 163.8 LBS | DIASTOLIC BLOOD PRESSURE: 74 MMHG

## 2022-06-16 DIAGNOSIS — Z12.31 ENCOUNTER FOR SCREENING MAMMOGRAM FOR MALIGNANT NEOPLASM OF BREAST: ICD-10-CM

## 2022-06-16 DIAGNOSIS — Z01.419 ENCOUNTER FOR ANNUAL ROUTINE GYNECOLOGICAL EXAMINATION: Primary | ICD-10-CM

## 2022-06-16 PROBLEM — Z98.84 S/P LAPAROSCOPIC SLEEVE GASTRECTOMY: Status: ACTIVE | Noted: 2018-12-26

## 2022-06-16 PROBLEM — G47.33 OSA (OBSTRUCTIVE SLEEP APNEA): Status: ACTIVE | Noted: 2018-08-06

## 2022-06-16 PROBLEM — M54.16 RIGHT LUMBAR RADICULOPATHY: Status: ACTIVE | Noted: 2017-06-20

## 2022-06-16 PROBLEM — F32.81 PMDD (PREMENSTRUAL DYSPHORIC DISORDER): Status: ACTIVE | Noted: 2017-11-08

## 2022-06-16 PROCEDURE — S0612 ANNUAL GYNECOLOGICAL EXAMINA: HCPCS

## 2022-06-16 RX ORDER — FLUCONAZOLE 100 MG/1
100 TABLET ORAL DAILY
COMMUNITY
Start: 2022-03-14 | End: 2022-06-16 | Stop reason: HOSPADM

## 2022-06-16 RX ORDER — COVID-19 ANTIGEN TEST
KIT MISCELLANEOUS
COMMUNITY
Start: 2022-03-10 | End: 2022-06-16 | Stop reason: HOSPADM

## 2022-06-16 NOTE — PATIENT INSTRUCTIONS
Wellness Visit for Adults   AMBULATORY CARE:   A wellness visit  is when you see your healthcare provider to get screened for health problems  Your healthcare provider will also give you advice on how to stay healthy  Write down your questions so you remember to ask them  Ask your healthcare provider how often you should have a wellness visit  What happens at a wellness visit:  Your healthcare provider will ask about your health, and your family history of health problems  This includes high blood pressure, heart disease, and cancer  He or she will ask if you have symptoms that concern you, if you smoke, and about your mood  You may also be asked about your intake of medicines, supplements, food, and alcohol  Any of the following may be done: Your weight  will be checked  Your height may also be checked so your body mass index (BMI) can be calculated  Your BMI shows if you are at a healthy weight  Your blood pressure  and heart rate will be checked  Your temperature may also be checked  Blood and urine tests  may be done  Blood tests may be done to check your cholesterol levels  Abnormal cholesterol levels increase your risk for heart disease and stroke  You may also need a blood or urine test to check for diabetes if you are at increased risk  Urine tests may be done to look for signs of an infection or kidney disease  A physical exam  includes checking your heartbeat and lungs with a stethoscope  Your healthcare provider may also check your skin to look for sun damage  Screening tests  may be recommended  A screening test is done to check for diseases that may not cause symptoms  The screening tests you may need depend on your age, gender, family history, and lifestyle habits  For example, colorectal screening may be recommended if you are 48years old or older  Screening tests you need if you are a woman:   A Pap smear  is used to screen for cervical cancer   Pap smears are usually done every 3 to 5 years depending on your age  You may need them more often if you have had abnormal Pap smear test results in the past  Ask your healthcare provider how often you should have a Pap smear  A mammogram  is an x-ray of your breasts to screen for breast cancer  Experts recommend mammograms every 2 years starting at age 48 years  You may need a mammogram at age 52 years or younger if you have an increased risk for breast cancer  Talk to your healthcare provider about when you should start having mammograms and how often you need them  Vaccines you may need:   Get an influenza vaccine  every year  The influenza vaccine protects you from the flu  Several types of viruses cause the flu  The viruses change over time, so new vaccines are made each year  Get a tetanus-diphtheria (Td) booster vaccine  every 10 years  This vaccine protects you against tetanus and diphtheria  Tetanus is a severe infection that may cause painful muscle spasms and lockjaw  Diphtheria is a severe bacterial infection that causes a thick covering in the back of your mouth and throat  Get a human papillomavirus (HPV) vaccine  if you are female and aged 23 to 32 or male 23 to 24 and never received it  This vaccine protects you from HPV infection  HPV is the most common infection spread by sexual contact  HPV may also cause vaginal, penile, and anal cancers  Get a pneumococcal vaccine  if you are aged 72 years or older  The pneumococcal vaccine is an injection given to protect you from pneumococcal disease  Pneumococcal disease is an infection caused by pneumococcal bacteria  The infection may cause pneumonia, meningitis, or an ear infection  Get a shingles vaccine  if you are 60 or older, even if you have had shingles before  The shingles vaccine is an injection to protect you from the varicella-zoster virus  This is the same virus that causes chickenpox   Shingles is a painful rash that develops in people who had chickenpox or have been exposed to the virus  How to eat healthy:  My Plate is a model for planning healthy meals  It shows the types and amounts of foods that should go on your plate  Fruits and vegetables make up about half of your plate, and grains and protein make up the other half  A serving of dairy is included on the side of your plate  The amount of calories and serving sizes you need depends on your age, gender, weight, and height  Examples of healthy foods are listed below:  Eat a variety of vegetables  such as dark green, red, and orange vegetables  You can also include canned vegetables low in sodium (salt) and frozen vegetables without added butter or sauces  Eat a variety of fresh fruits , canned fruit in 100% juice, frozen fruit, and dried fruit  Include whole grains  At least half of the grains you eat should be whole grains  Examples include whole-wheat bread, wheat pasta, brown rice, and whole-grain cereals such as oatmeal     Eat a variety of protein foods such as seafood (fish and shellfish), lean meat, and poultry without skin (turkey and chicken)  Examples of lean meats include pork leg, shoulder, or tenderloin, and beef round, sirloin, tenderloin, and extra lean ground beef  Other protein foods include eggs and egg substitutes, beans, peas, soy products, nuts, and seeds  Choose low-fat dairy products such as skim or 1% milk or low-fat yogurt, cheese, and cottage cheese  Limit unhealthy fats  such as butter, hard margarine, and shortening  Exercise:  Exercise at least 30 minutes per day on most days of the week  Some examples of exercise include walking, biking, dancing, and swimming  You can also fit in more physical activity by taking the stairs instead of the elevator or parking farther away from stores  Include muscle strengthening activities 2 days each week  Regular exercise provides many health benefits   It helps you manage your weight, and decreases your risk for type 2 diabetes, heart disease, stroke, and high blood pressure  Exercise can also help improve your mood  Ask your healthcare provider about the best exercise plan for you  General health and safety guidelines:   Do not smoke  Nicotine and other chemicals in cigarettes and cigars can cause lung damage  Ask your healthcare provider for information if you currently smoke and need help to quit  E-cigarettes or smokeless tobacco still contain nicotine  Talk to your healthcare provider before you use these products  Limit alcohol  A drink of alcohol is 12 ounces of beer, 5 ounces of wine, or 1½ ounces of liquor  Lose weight, if needed  Being overweight increases your risk of certain health conditions  These include heart disease, high blood pressure, type 2 diabetes, and certain types of cancer  Protect your skin  Do not sunbathe or use tanning beds  Use sunscreen with a SPF 15 or higher  Apply sunscreen at least 15 minutes before you go outside  Reapply sunscreen every 2 hours  Wear protective clothing, hats, and sunglasses when you are outside  Drive safely  Always wear your seatbelt  Make sure everyone in your car wears a seatbelt  A seatbelt can save your life if you are in an accident  Do not use your cell phone when you are driving  This could distract you and cause an accident  Pull over if you need to make a call or send a text message  Practice safe sex  Use latex condoms if are sexually active and have more than one partner  Your healthcare provider may recommend screening tests for sexually transmitted infections (STIs)  Wear helmets, lifejackets, and protective gear  Always wear a helmet when you ride a bike or motorcycle, go skiing, or play sports that could cause a head injury  Wear protective equipment when you play sports  Wear a lifejacket when you are on a boat or doing water sports      © Copyright [employer] 2022 Information is for End User's use only and may not be sold, redistributed or otherwise used for commercial purposes  All illustrations and images included in CareNotes® are the copyrighted property of [employer] D [employer] M [employer] Inc  or Heaven Sanchez  The above information is an  only  It is not intended as medical advice for individual conditions or treatments  Talk to your doctor, nurse or pharmacist before following any medical regimen to see if it is safe and effective for you  Breast Self Exam for Women   AMBULATORY CARE:   A breast self-exam (BSE)  is a way to check your breasts for lumps and other changes  Regular BSEs can help you know how your breasts normally look and feel  Most breast lumps or changes are not cancer, but you should always have them checked by a healthcare provider  Why you should do a BSE:  Breast cancer is the most common type of cancer in women  Even if you have mammograms, you may still want to do a BSE regularly  If you know how your breasts normally feel and look, it may help you know when to contact your healthcare provider  Mammograms can miss some cancers  You may find a lump during a BSE that did not show up on a mammogram   When you should do a BSE:  If you have periods, you may want to do your BSE 1 week after your period ends  This is the time when your breasts may be the least swollen, lumpy, or tender  You can do regular BSEs even if you are breastfeeding or have breast implants  Call your doctor if:   You find any lumps or changes in your breasts  You have breast pain or fluid coming from your nipples  You have questions or concerns about your condition or care  How to do a BSE:       Look at your breasts in a mirror  Look at the size and shape of each breast and nipple  Check for swelling, lumps, dimpling, scaly skin, or other skin changes  Look for nipple changes, such as a nipple that is painful or beginning to pull inward   Gently squeeze both nipples and check to see if fluid (that is not breast milk) comes out of them  If you find any of these or other breast changes, contact your healthcare provider  Check your breasts while you sit or  the following 3 positions:    Roseline Coombe your arms down at your sides  Raise your hands and join them behind your head  Put firm pressure with your hands on your hips  Bend slightly forward while you look at your breasts in the mirror  Lie down and feel your breasts  When you lie down, your breast tissue spreads out evenly over your chest  This makes it easier for you to feel for lumps and anything that may not be normal for your breasts  Do a BSE on one breast at a time  Place a small pillow or towel under your left shoulder  Put your left arm behind your head  Use the 3 middle fingers of your right hand  Use your fingertip pads, on the top of your fingers  Your fingertip pad is the most sensitive part of your finger  Use small circles to feel your breast tissue  Use your fingertip pads to make dime-sized, overlapping circles on your breast and armpits  Use light, medium, and firm pressure  First, press lightly  Second, press with medium pressure to feel a little deeper into the breast  Last, use firm pressure to feel deep within your breast     Examine your entire breast area  Examine the breast area from above the breast to below the breast where you feel only ribs  Make small circles with your fingertips, starting in the middle of your armpit  Make circles going up and down the breast area  Continue toward your breast and all the way across it  Examine the area from your armpit all the way over to the middle of your chest (breastbone)  Stop at the middle of your chest     Move the pillow or towel to your right shoulder, and put your right arm behind your head    Use the 3 fingertip pads of your left hand, and repeat the above steps to do a BSE on your right breast     What else you can do to check for breast problems or cancer:  Talk to your healthcare provider about mammograms  A mammogram is an x-ray of your breasts to screen for breast cancer or other problems  Your provider can tell you the benefits and risks of mammograms  The first mammogram is usually at age 39 or 48  Your provider may recommend you start at 36 or younger if your risk for breast cancer is high  Mammograms usually continue every 1 to 2 years until age 76  Follow up with your doctor as directed:  Write down your questions so you remember to ask them during your visits  © Copyright [employer] 2022 Information is for End User's use only and may not be sold, redistributed or otherwise used for commercial purposes  All illustrations and images included in CareNotes® are the copyrighted property of A D A M , Inc  or [facility] Johana Bauman   The above information is an  only  It is not intended as medical advice for individual conditions or treatments  Talk to your doctor, nurse or pharmacist before following any medical regimen to see if it is safe and effective for you

## 2022-06-16 NOTE — PROGRESS NOTES
Diagnoses and all orders for this visit:    Encounter for annual routine gynecological examination    Encounter for screening mammogram for malignant neoplasm of breast  -     Mammo screening bilateral w 3d & cad; Future    A pap smear was not performed  ASCCP guidelines reviewed  SBE, daily exercise and healthy diet with adequate calcium and vitamin D encouraged  Weight bearing exercises a minimum of 150 minutes/week advised  Yearly mammograms advised  Stay current with all recommended health and wellness screenings  Advised to call with any issues, all concerns & questions addressed  See provided information in your after visit summary     F/U Annually and PRN    Results will be released to [facility], if abnormal will call or message to review and discuss treatment plan  Health Maintenance:    Last PAP: 06/03/2020  Results were: Negative  Next PAP Due: 2025    Last Mammogram: 07/02/2020  Results were: benign asymmetry  Diagnostic mammo and US were negative for malignancy  Next Mammogram: due this year  Order provided  Subjective    CC: Yearly Exam     Anand Peña is a 42 y o  female here for an annual exam  [id number]    Denies history of abnormal pap smears or mammograms  Her menstrual cycles are regular every 28-30 days  She denies any issues with bleeding or her menses  She denies breast concerns, abnormal vaginal discharge, vaginal itching, odor, irritation, bowel/bladder dysfunction, urinary symptoms, pelvic pain, or dyspareunia today  She is sexually active  Monogamous relationship  She denies issues with intimacy  Her current method of contraception includes bilateral tubal ligation  Denies intimate partner violence  STD testing:  She does not want STD testing today  Patient's last menstrual period was 05/30/2022 (exact date)      Past Medical History:   Diagnosis Date    Back pain, chronic     Candidal vulvovaginitis     Fatigue     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     Lumbar degenerative disc disease     Menorrhagia     Morbid obesity (HCC)     Obesity, Class III, BMI 40-49 9 (morbid obesity) ([facility] [address] ) 2018    PMDD (premenstrual dysphoric disorder)      Past Surgical History:   Procedure Laterality Date     SECTION      x3    GASTRIC RESTRICTION SURGERY      INDUCED       by dilation and evacuation     WISDOM TOOTH EXTRACTION         Immunization History   Administered Date(s) Administered    COVID-19 PFIZER VACCINE 0 3 ML IM 2021       Family History   Problem Relation Age of Onset    Anxiety disorder Mother     Depression Mother     Uterine cancer Mother 64    Hypertension Mother     Prostate cancer Father     Osteoporosis Maternal Grandmother     Heart disease Maternal Grandmother     Stroke Maternal Grandmother     Anxiety disorder Maternal Grandfather     Alcohol abuse Maternal Grandfather     Bipolar disorder Maternal Grandfather     Depression Maternal Grandfather     Heart disease Maternal Grandfather     Lung cancer Maternal Grandfather 79    Breast cancer Paternal Grandmother 48    Alcohol abuse Paternal Grandfather     Lung cancer Paternal Grandfather     Depression Maternal Aunt     Bipolar disorder Maternal Aunt     Anxiety disorder Maternal Aunt     Other Maternal Aunt         epilepsy     Aneurysm Maternal Aunt         brain    Schizophrenia Maternal Uncle     Thyroid disease Paternal Aunt     Thyroid disease Cousin     Diabetes Neg Hx     Thyroid cancer Neg Hx     Colon cancer Neg Hx     Ovarian cancer Neg Hx     Cervical cancer Neg Hx      Social History     Tobacco Use    Smoking status: Former Smoker    Smokeless tobacco: Never Used    Tobacco comment: 1/2-1 ppd x 10 years    Vaping Use    Vaping Use: Never used   Substance Use Topics    Alcohol use: Yes     Comment: socially - 1-2 glasses of wine weekly     Drug use: No       Current Outpatient Medications:   cyclobenzaprine (FLEXERIL) 5 mg tablet, Take 1 tablet (5 mg total) by mouth daily at bedtime, Disp: 20 tablet, Rfl: 1    Flowflex COVID-19 Ag Home Test KIT, , Disp: , Rfl:     fluconazole (DIFLUCAN) 100 mg tablet, Take 100 mg by mouth daily, Disp: , Rfl:     methylPREDNISolone 4 MG tablet therapy pack, Use as directed on package, Disp: 21 tablet, Rfl: 0  Patient Active Problem List    Diagnosis Date Noted    S/P laparoscopic sleeve gastrectomy 2018    OLAG (obstructive sleep apnea) 2018    Sleep disturbance 2018    PMDD (premenstrual dysphoric disorder) 2017    Lumbar degenerative disc disease 10/30/2017    Back pain, chronic 2017    Right lumbar radiculopathy 2017       Allergies   Allergen Reactions    Other Allergic Rhinitis     Seasonal Allergies       OB History    Para Term  AB Living   4 3 3   1 3   SAB IAB Ectopic Multiple Live Births     1     3      # Outcome Date GA Lbr Khris/2nd Weight Sex Delivery Anes PTL Lv   4 Term      CS-LTranv   CHRISTINA   3 Term      CS-LTranv   CHRISTINA   2 IAB            1 Term      CS-LTranv   CHRISTINA      Birth Comments: tubal       Vitals:    22 1348   BP: 112/74   BP Location: Right arm   Patient Position: Sitting   Cuff Size: Standard   Weight: 74 3 kg (163 lb 12 8 oz)   Height: 5' 4" (1 626 m)     Body mass index is 28 12 kg/m²  Review of Systems   Constitutional: Negative for chills, fatigue, fever and unexpected weight change  Respiratory: Negative for cough and shortness of breath  Cardiovascular: Negative for chest pain, palpitations and leg swelling  Gastrointestinal: Negative for abdominal distention, abdominal pain, blood in stool, constipation, diarrhea and nausea  Endocrine: Negative for cold intolerance and heat intolerance     Genitourinary: Negative for difficulty urinating, dyspareunia, dysuria, frequency, hematuria, menstrual problem, pelvic pain, urgency, vaginal bleeding, vaginal discharge and vaginal pain  Musculoskeletal: Negative for back pain  Skin: Negative for rash  Neurological: Negative for headaches  Psychiatric/Behavioral: Negative for confusion and dysphoric mood  All other systems reviewed and are negative  Physical Exam  Constitutional:       General: She is not in acute distress  Appearance: Normal appearance  She is not ill-appearing  Genitourinary:      Bladder and urethral meatus normal       No lesions in the vagina  Right Labia: No rash, tenderness or lesions  Left Labia: No tenderness, lesions or rash  No inguinal adenopathy present in the right or left side  No vaginal discharge, erythema or tenderness  Right Adnexa: not tender, not full and no mass present  Left Adnexa: not tender, not full and no mass present  Cervical nabothian cyst present  No cervical motion tenderness, discharge, friability or lesion  Uterus is not enlarged, fixed or tender  No uterine mass detected  Uterus is anteverted  No urethral tenderness, mass or discharge present  Breasts:      Right: No swelling, inverted nipple, mass, nipple discharge, skin change, tenderness, axillary adenopathy or supraclavicular adenopathy  Left: No swelling, inverted nipple, mass, nipple discharge, skin change, tenderness, axillary adenopathy or supraclavicular adenopathy  HENT:      Head: Normocephalic and atraumatic  Eyes:      Conjunctiva/sclera: Conjunctivae normal    Cardiovascular:      Rate and Rhythm: Normal rate and regular rhythm  Pulmonary:      Effort: Pulmonary effort is normal       Breath sounds: Normal breath sounds  Abdominal:      General: There is no distension  Palpations: Abdomen is soft  Tenderness: There is no abdominal tenderness  Musculoskeletal:         General: Normal range of motion  Cervical back: Normal range of motion and neck supple     Lymphadenopathy:      Upper Body: Right upper body: No supraclavicular or axillary adenopathy  Left upper body: No supraclavicular or axillary adenopathy  Lower Body: No right inguinal adenopathy  No left inguinal adenopathy  Neurological:      Mental Status: She is alert and oriented to person, place, and time  Skin:     General: Skin is warm and dry  Psychiatric:         Mood and Affect: Mood normal          Behavior: Behavior normal          Thought Content: Thought content normal          Judgment: Judgment normal    Vitals and nursing note reviewed

## 2022-06-16 NOTE — PROGRESS NOTES
Patient is here today for a gynecological annual exam    No questions or concerns today  LMP: 2022      Last pap: 2022     Hx of abnormal paps? Yes   Last Mammo: 2020    UTD with Covid vaccine

## 2023-06-27 ENCOUNTER — TELEPHONE (OUTPATIENT)
Dept: OBGYN CLINIC | Facility: CLINIC | Age: 43
End: 2023-06-27

## 2023-06-27 DIAGNOSIS — Z12.31 VISIT FOR SCREENING MAMMOGRAM: Primary | ICD-10-CM

## 2023-06-27 NOTE — TELEPHONE ENCOUNTER
Pt is following up on mammo script to see if it was entered  I let her know that it was and she can go ahead and schedule

## 2023-07-21 ENCOUNTER — HOSPITAL ENCOUNTER (OUTPATIENT)
Age: 43
Discharge: HOME | End: 2023-07-21
Payer: COMMERCIAL

## 2023-07-21 VITALS — HEIGHT: 62 IN | WEIGHT: 165.34 LBS | BODY MASS INDEX: 30.43 KG/M2

## 2023-07-21 DIAGNOSIS — Z12.31 VISIT FOR SCREENING MAMMOGRAM: ICD-10-CM

## 2023-07-21 PROCEDURE — 77063 BREAST TOMOSYNTHESIS BI: CPT

## 2023-07-21 PROCEDURE — 77067 SCR MAMMO BI INCL CAD: CPT

## 2023-07-31 ENCOUNTER — TELEPHONE (OUTPATIENT)
Dept: OBGYN CLINIC | Facility: CLINIC | Age: 43
End: 2023-07-31

## 2023-08-04 ENCOUNTER — TELEPHONE (OUTPATIENT)
Dept: OBGYN CLINIC | Facility: CLINIC | Age: 43
End: 2023-08-04

## 2023-08-04 NOTE — TELEPHONE ENCOUNTER
----- Message from Nancy, [address] [place] [address] sent at 7/24/2023 11:26 AM EDT -----  Please let patient know her mammo results are normal.

## 2024-07-23 ENCOUNTER — APPOINTMENT (OUTPATIENT)
Dept: LAB | Facility: HOSPITAL | Age: 44
End: 2024-07-23
Payer: COMMERCIAL

## 2024-07-23 ENCOUNTER — OFFICE VISIT (OUTPATIENT)
Dept: INTERNAL MEDICINE CLINIC | Facility: CLINIC | Age: 44
End: 2024-07-23
Payer: COMMERCIAL

## 2024-07-23 ENCOUNTER — HOSPITAL ENCOUNTER (OUTPATIENT)
Dept: MAMMOGRAPHY | Facility: CLINIC | Age: 44
Discharge: HOME | End: 2024-07-23
Payer: COMMERCIAL

## 2024-07-23 VITALS — BODY MASS INDEX: 30.73 KG/M2 | HEIGHT: 62 IN | WEIGHT: 167 LBS

## 2024-07-23 VITALS
HEIGHT: 62 IN | SYSTOLIC BLOOD PRESSURE: 120 MMHG | OXYGEN SATURATION: 98 % | HEART RATE: 87 BPM | BODY MASS INDEX: 30.73 KG/M2 | RESPIRATION RATE: 16 BRPM | DIASTOLIC BLOOD PRESSURE: 82 MMHG | WEIGHT: 167 LBS

## 2024-07-23 DIAGNOSIS — Z11.4 SCREENING FOR HIV (HUMAN IMMUNODEFICIENCY VIRUS): ICD-10-CM

## 2024-07-23 DIAGNOSIS — K43.2 INCISIONAL HERNIA, WITHOUT OBSTRUCTION OR GANGRENE: ICD-10-CM

## 2024-07-23 DIAGNOSIS — Z13.6 SCREENING FOR HEART DISEASE: ICD-10-CM

## 2024-07-23 DIAGNOSIS — Z90.3 S/P GASTRIC SLEEVE PROCEDURE: ICD-10-CM

## 2024-07-23 DIAGNOSIS — Z12.31 SCREENING MAMMOGRAM FOR BREAST CANCER: ICD-10-CM

## 2024-07-23 DIAGNOSIS — E53.8 B12 DEFICIENCY: Primary | ICD-10-CM

## 2024-07-23 DIAGNOSIS — R53.83 OTHER FATIGUE: ICD-10-CM

## 2024-07-23 DIAGNOSIS — Z00.00 ANNUAL PHYSICAL EXAM: Primary | ICD-10-CM

## 2024-07-23 DIAGNOSIS — Z11.59 NEED FOR HEPATITIS C SCREENING TEST: ICD-10-CM

## 2024-07-23 DIAGNOSIS — E66.09 CLASS 1 OBESITY DUE TO EXCESS CALORIES WITH SERIOUS COMORBIDITY AND BODY MASS INDEX (BMI) OF 30.0 TO 30.9 IN ADULT: ICD-10-CM

## 2024-07-23 PROBLEM — E66.811 CLASS 1 OBESITY DUE TO EXCESS CALORIES WITH SERIOUS COMORBIDITY AND BODY MASS INDEX (BMI) OF 30.0 TO 30.9 IN ADULT: Status: ACTIVE | Noted: 2024-07-23

## 2024-07-23 LAB
25(OH)D3 SERPL-MCNC: 38.2 NG/ML (ref 30–100)
ALBUMIN SERPL BCG-MCNC: 4.2 G/DL (ref 3.5–5)
ALP SERPL-CCNC: 59 U/L (ref 34–104)
ALT SERPL W P-5'-P-CCNC: 19 U/L (ref 7–52)
ANION GAP SERPL CALCULATED.3IONS-SCNC: 10 MMOL/L (ref 4–13)
AST SERPL W P-5'-P-CCNC: 24 U/L (ref 13–39)
BASOPHILS # BLD AUTO: 0.03 THOUSANDS/ÂΜL (ref 0–0.1)
BASOPHILS NFR BLD AUTO: 0 % (ref 0–1)
BILIRUB SERPL-MCNC: 0.61 MG/DL (ref 0.2–1)
BUN SERPL-MCNC: 6 MG/DL (ref 5–25)
CALCIUM SERPL-MCNC: 9.1 MG/DL (ref 8.4–10.2)
CHLORIDE SERPL-SCNC: 101 MMOL/L (ref 96–108)
CHOLEST SERPL-MCNC: 206 MG/DL
CO2 SERPL-SCNC: 26 MMOL/L (ref 21–32)
CREAT SERPL-MCNC: 0.65 MG/DL (ref 0.6–1.3)
EOSINOPHIL # BLD AUTO: 0.07 THOUSAND/ÂΜL (ref 0–0.61)
EOSINOPHIL NFR BLD AUTO: 1 % (ref 0–6)
ERYTHROCYTE [DISTWIDTH] IN BLOOD BY AUTOMATED COUNT: 12.9 % (ref 11.6–15.1)
GFR SERPL CREATININE-BSD FRML MDRD: 108 ML/MIN/1.73SQ M
GLUCOSE P FAST SERPL-MCNC: 82 MG/DL (ref 65–99)
HCT VFR BLD AUTO: 43.4 % (ref 34.8–46.1)
HCV AB SER QL: NORMAL
HDLC SERPL-MCNC: 115 MG/DL
HGB BLD-MCNC: 14.3 G/DL (ref 11.5–15.4)
HIV 1+2 AB+HIV1 P24 AG SERPL QL IA: NORMAL
HIV 2 AB SERPL QL IA: NORMAL
HIV1 AB SERPL QL IA: NORMAL
HIV1 P24 AG SERPL QL IA: NORMAL
IMM GRANULOCYTES # BLD AUTO: 0.11 THOUSAND/UL (ref 0–0.2)
IMM GRANULOCYTES NFR BLD AUTO: 1 % (ref 0–2)
LDLC SERPL CALC-MCNC: 74 MG/DL (ref 0–100)
LYMPHOCYTES # BLD AUTO: 1.33 THOUSANDS/ÂΜL (ref 0.6–4.47)
LYMPHOCYTES NFR BLD AUTO: 17 % (ref 14–44)
MCH RBC QN AUTO: 32.1 PG (ref 26.8–34.3)
MCHC RBC AUTO-ENTMCNC: 32.9 G/DL (ref 31.4–37.4)
MCV RBC AUTO: 98 FL (ref 82–98)
MONOCYTES # BLD AUTO: 0.5 THOUSAND/ÂΜL (ref 0.17–1.22)
MONOCYTES NFR BLD AUTO: 7 % (ref 4–12)
NEUTROPHILS # BLD AUTO: 5.65 THOUSANDS/ÂΜL (ref 1.85–7.62)
NEUTS SEG NFR BLD AUTO: 74 % (ref 43–75)
NONHDLC SERPL-MCNC: 91 MG/DL
NRBC BLD AUTO-RTO: 0 /100 WBCS
PLATELET # BLD AUTO: 274 THOUSANDS/UL (ref 149–390)
PMV BLD AUTO: 10.1 FL (ref 8.9–12.7)
POTASSIUM SERPL-SCNC: 4.5 MMOL/L (ref 3.5–5.3)
PROT SERPL-MCNC: 6.8 G/DL (ref 6.4–8.4)
RBC # BLD AUTO: 4.45 MILLION/UL (ref 3.81–5.12)
SODIUM SERPL-SCNC: 137 MMOL/L (ref 135–147)
TRIGL SERPL-MCNC: 87 MG/DL
TSH SERPL DL<=0.05 MIU/L-ACNC: 1.18 UIU/ML (ref 0.45–4.5)
VIT B12 SERPL-MCNC: 91 PG/ML (ref 180–914)
WBC # BLD AUTO: 7.69 THOUSAND/UL (ref 4.31–10.16)

## 2024-07-23 PROCEDURE — 77063 BREAST TOMOSYNTHESIS BI: CPT

## 2024-07-23 PROCEDURE — 80061 LIPID PANEL: CPT

## 2024-07-23 PROCEDURE — 77067 SCR MAMMO BI INCL CAD: CPT

## 2024-07-23 PROCEDURE — 87389 HIV-1 AG W/HIV-1&-2 AB AG IA: CPT

## 2024-07-23 PROCEDURE — 99396 PREV VISIT EST AGE 40-64: CPT | Performed by: PHYSICIAN ASSISTANT

## 2024-07-23 PROCEDURE — 85025 COMPLETE CBC W/AUTO DIFF WBC: CPT

## 2024-07-23 PROCEDURE — 82306 VITAMIN D 25 HYDROXY: CPT

## 2024-07-23 PROCEDURE — 84443 ASSAY THYROID STIM HORMONE: CPT

## 2024-07-23 PROCEDURE — 86803 HEPATITIS C AB TEST: CPT

## 2024-07-23 PROCEDURE — 80053 COMPREHEN METABOLIC PANEL: CPT

## 2024-07-23 PROCEDURE — 99213 OFFICE O/P EST LOW 20 MIN: CPT | Performed by: PHYSICIAN ASSISTANT

## 2024-07-23 PROCEDURE — 36415 COLL VENOUS BLD VENIPUNCTURE: CPT

## 2024-07-23 PROCEDURE — 82607 VITAMIN B-12: CPT

## 2024-07-23 NOTE — PROGRESS NOTES
Adult Annual Physical  Name: Farzaneh James      : 1980      MRN: [id number]  Encounter Provider: Katherin Alicea PA-C  Encounter Date: 2024   Encounter department: [facility] INTERNAL MEDICINE [place]    Assessment & Plan   1. Annual physical exam  Patient is due for labs, will perform physical exam  2. Need for hepatitis C screening test  -     Hepatitis C Antibody; Future  3. Screening for HIV (human immunodeficiency virus)  -     HIV 1/2 AG/AB w Reflex [facility] for 2 yr old and above; Future  4. Other fatigue  -     CBC and differential; Future  -     TSH, 3rd generation with Free T4 reflex; Future  5. Screening for heart disease  -     Comprehensive metabolic panel; Future  -     Lipid panel; Future  6. S/P gastric sleeve procedure  -     Vitamin B12; Future  -     Vitamin D 25 hydroxy; Future  7. Incisional hernia, without obstruction or gangrene  -     Ambulatory Referral to General Surgery; Future  8. Class 1 obesity due to excess calories with serious comorbidity and body mass index (BMI) of 30.0 to 30.9 in adult  -     Ambulatory Referral to Weight Management; Future    Immunizations and preventive care screenings were discussed with patient today. Appropriate education was printed on patient's after visit summary.    Counseling:  Dental Health: discussed importance of regular tooth brushing, flossing, and dental visits.  Sexual health: discussed sexually transmitted diseases, partner selection, use of condoms, avoidance of unintended pregnancy, and contraceptive alternatives.  Exercise: the importance of regular exercise/physical activity was discussed. Recommend exercise 3-5 times per week for at least 30 minutes.       Depression Screening and Follow-up Plan: Patient was screened for depression during today's encounter. They screened negative with a PHQ-2 score of 0.        History of Present Illness     Adult Annual Physical:  Patient presents for annual physical. Patient is a  "pleasant 43 year old female that presents in the office today annual physical exam. Today she complains of incisional hernia that she is able to reduce and weight gain/inability to lose weight. Hernia is currently reduced and unable to be palpated. She reports that she eats 3 small meals per day consisting on of protein, fruits, and veggies with a total of around 1200 calories daily. .     Diet and Physical Activity:  - Diet/Nutrition: well balanced diet. 3 small meals  - Exercise: 3-4 times a week on average and 30-60 minutes on average.    Depression Screening:  - PHQ-2 Score: 0    General Health:  - Sleep: sleeps well and 7-8 hours of sleep on average.  - Hearing: normal hearing bilateral ears.  - Vision: wears glasses.  - Dental: regular dental visits.    /GYN Health:  - Follows with GYN: yes.   - Last menstrual cycle: 7/23/2024.   - History of STDs: no  - Contraception:. tubes tied      Advanced Care Planning:  - Has an advanced directive?: no    - Has a durable medical POA?: no    - ACP document given to patient?: no      Review of Systems   Respiratory:  Negative for shortness of breath and wheezing.    Cardiovascular:  Negative for chest pain and palpitations.   Gastrointestinal:  Negative for constipation, diarrhea and nausea.   Genitourinary:  Negative for difficulty urinating.   Neurological:  Negative for light-headedness and headaches.   Psychiatric/Behavioral:  Negative for dysphoric mood. The patient is not nervous/anxious.          Objective     /82 (BP Location: Left arm, Patient Position: Sitting, Cuff Size: Adult)   Pulse 87   Resp 16   Ht 5' 2\" (1.575 m)   Wt 75.8 kg (167 lb)   SpO2 98%   BMI 30.54 kg/m²     Physical Exam  Constitutional:       Appearance: Normal appearance.   HENT:      Right Ear: Tympanic membrane normal.      Left Ear: Tympanic membrane normal.      Nose: Nose normal.      Mouth/Throat:      Mouth: Mucous membranes are moist.   Cardiovascular:      Rate and " Rhythm: Normal rate and regular rhythm.      Pulses: Normal pulses.   Pulmonary:      Effort: Pulmonary effort is normal.      Breath sounds: Normal breath sounds.   Abdominal:      General: Abdomen is flat.      Palpations: Abdomen is soft.   Skin:     General: Skin is warm.   Neurological:      General: No focal deficit present.      Mental Status: She is alert and oriented to person, place, and time.   Psychiatric:         Mood and Affect: Mood normal.

## 2024-07-26 ENCOUNTER — CLINICAL SUPPORT (OUTPATIENT)
Dept: INTERNAL MEDICINE CLINIC | Facility: CLINIC | Age: 44
End: 2024-07-26
Payer: COMMERCIAL

## 2024-07-26 DIAGNOSIS — E53.8 DEFICIENCY OF VITAMIN B12: Primary | ICD-10-CM

## 2024-07-26 PROCEDURE — 96372 THER/PROPH/DIAG INJ SC/IM: CPT

## 2024-07-26 RX ORDER — CYANOCOBALAMIN 1000 UG/ML
1000 INJECTION, SOLUTION INTRAMUSCULAR; SUBCUTANEOUS
Status: SHIPPED | OUTPATIENT
Start: 2024-07-26

## 2024-07-26 RX ADMIN — CYANOCOBALAMIN 1000 MCG: 1000 INJECTION, SOLUTION INTRAMUSCULAR; SUBCUTANEOUS at 16:42

## 2024-08-30 ENCOUNTER — CLINICAL SUPPORT (OUTPATIENT)
Dept: INTERNAL MEDICINE CLINIC | Facility: CLINIC | Age: 44
End: 2024-08-30
Payer: COMMERCIAL

## 2024-08-30 DIAGNOSIS — E53.8 B12 DEFICIENCY: Primary | ICD-10-CM

## 2024-08-30 PROCEDURE — 96372 THER/PROPH/DIAG INJ SC/IM: CPT

## 2024-08-30 RX ADMIN — CYANOCOBALAMIN 1000 MCG: 1000 INJECTION, SOLUTION INTRAMUSCULAR; SUBCUTANEOUS at 13:07

## 2024-11-12 PROBLEM — Z48.815 ENCOUNTER FOR SURGICAL AFTERCARE FOLLOWING SURGERY OF DIGESTIVE SYSTEM: Status: ACTIVE | Noted: 2018-12-26

## 2024-11-12 PROBLEM — K91.2 POSTSURGICAL MALABSORPTION: Status: ACTIVE | Noted: 2024-11-12

## 2024-11-12 NOTE — ASSESSMENT & PLAN NOTE
-At risk for malabsorption of vitamins/minerals secondary to malabsorption and restriction of intake from bariatric surgery  -NOT Currently taking adequate postop bariatric surgery vitamin supplementation - none - needs to start Justino MVI with 45mg iron and calcium citrate 500mg TID    -Needs CBC/Metabolic panel - noted Vitamin B12 deficiency in July  -Patient received education about the importance of adhering to a lifelong supplementation regimen to avoid vitamin/mineral deficiencies

## 2024-11-12 NOTE — ASSESSMENT & PLAN NOTE
-s/p Vertical Sleeve Gastrectomy at [facility] in 2019    Patient is struggling with weight regain and ready to get back on track.  They will work on diet and lifestyle changes - especially 30/60 rule, avoid mindless snacking, increase protein and fiber, avoid sugary drinks, avoid meal skipping, track/log, and exercise. Recommend consult with surgical RD and LCSW.     Will trial Wegovy. Patient denies personal history of pancreatitis. Patient also denies personal and family history of medullary thyroid cancer and multiple endocrine neoplasia type 2 (MEN 2 tumor).     If not covered will obtain ECG and do short course of phentermine. No HTN or cardiac history or anxiety, sleeps well. She is agreeable to this plan.    Needs surveillance EGD to assess esophagus and r/o Teixeira's - due for colonoscopy so will refer to GI for both.    Initial: 224lbs  Current: 168lbs  Owen: 150lbs  Current BMI is Body mass index is 29.22 kg/m².    Tolerating a regular diet-yes  Eating at least 60 grams of protein per day-yes  Following 30/60 minute rule with liquids-no  Drinking at least 64 ounces of fluid per day-yes  Drinking carbonated beverages-no  Sufficient exercise-yes  Using NSAIDs regularly-no  Using nicotine-no  Using alcohol-yes. Advised about the risks of alcohol s/p bariatric surgery and recommend avoiding all alcohol

## 2024-11-13 ENCOUNTER — OFFICE VISIT (OUTPATIENT)
Dept: BARIATRICS | Facility: CLINIC | Age: 44
End: 2024-11-13
Payer: COMMERCIAL

## 2024-11-13 VITALS
BODY MASS INDEX: 28.68 KG/M2 | WEIGHT: 168 LBS | HEART RATE: 90 BPM | DIASTOLIC BLOOD PRESSURE: 72 MMHG | SYSTOLIC BLOOD PRESSURE: 112 MMHG | HEIGHT: 64 IN | RESPIRATION RATE: 16 BRPM

## 2024-11-13 DIAGNOSIS — R63.2 EXCESSIVE HUNGER: ICD-10-CM

## 2024-11-13 DIAGNOSIS — E66.09 CLASS 1 OBESITY DUE TO EXCESS CALORIES WITH SERIOUS COMORBIDITY AND BODY MASS INDEX (BMI) OF 30.0 TO 30.9 IN ADULT: ICD-10-CM

## 2024-11-13 DIAGNOSIS — E66.3 OVERWEIGHT: ICD-10-CM

## 2024-11-13 DIAGNOSIS — K91.2 POSTSURGICAL MALABSORPTION: ICD-10-CM

## 2024-11-13 DIAGNOSIS — E66.811 CLASS 1 OBESITY DUE TO EXCESS CALORIES WITH SERIOUS COMORBIDITY AND BODY MASS INDEX (BMI) OF 30.0 TO 30.9 IN ADULT: ICD-10-CM

## 2024-11-13 DIAGNOSIS — Z12.11 COLON CANCER SCREENING: ICD-10-CM

## 2024-11-13 DIAGNOSIS — Z48.815 ENCOUNTER FOR SURGICAL AFTERCARE FOLLOWING SURGERY OF DIGESTIVE SYSTEM: Primary | ICD-10-CM

## 2024-11-13 PROCEDURE — 99204 OFFICE O/P NEW MOD 45 MIN: CPT | Performed by: PHYSICIAN ASSISTANT

## 2024-11-13 NOTE — PROGRESS NOTES
Assessment/Plan:    Encounter for surgical aftercare following surgery of digestive system  -s/p Vertical Sleeve Gastrectomy at [facility] in 2019    Patient is struggling with weight regain and ready to get back on track.  They will work on diet and lifestyle changes - especially 30/60 rule, avoid mindless snacking, increase protein and fiber, avoid sugary drinks, avoid meal skipping, track/log, and exercise. Recommend consult with surgical RD and LCSW.     Will trial Wegovy. Patient denies personal history of pancreatitis. Patient also denies personal and family history of medullary thyroid cancer and multiple endocrine neoplasia type 2 (MEN 2 tumor).     If not covered will obtain ECG and do short course of phentermine. No HTN or cardiac history or anxiety, sleeps well. She is agreeable to this plan.    Needs surveillance EGD to assess esophagus and r/o Teixeira's.     Initial: 224lbs  Current: 168lbs  Owen: 150lbs  Current BMI is Body mass index is 29.22 kg/m².    Tolerating a regular diet-yes  Eating at least 60 grams of protein per day-yes  Following 30/60 minute rule with liquids-no  Drinking at least 64 ounces of fluid per day-yes  Drinking carbonated beverages-no  Sufficient exercise-yes  Using NSAIDs regularly-no  Using nicotine-no  Using alcohol-yes. Advised about the risks of alcohol s/p bariatric surgery and recommend avoiding all alcohol      Postsurgical malabsorption  -At risk for malabsorption of vitamins/minerals secondary to malabsorption and restriction of intake from bariatric surgery  -NOT Currently taking adequate postop bariatric surgery vitamin supplementation - none - needs to start Justino MVI with 45mg iron and calcium citrate 500mg TID    -Needs CBC/Metabolic panel - noted Vitamin B12 deficiency in July  -Patient received education about the importance of adhering to a lifelong supplementation regimen to avoid vitamin/mineral deficiencies        Diagnoses and all orders for this  visit:    Encounter for surgical aftercare following surgery of digestive system    Postsurgical malabsorption  -     CBC and differential; Future  -     Comprehensive metabolic panel; Future  -     Folate; Future  -     Hemoglobin A1C; Future  -     Iron Panel (Includes Ferritin, Iron Sat%, Iron, and TIBC); Future  -     Vitamin A; Future  -     TSH, 3rd generation with Free T4 reflex; Future  -     PTH, intact; Future  -     Lipid panel; Future  -     Vitamin B1, whole blood; Future  -     Vitamin B12; Future  -     Vitamin D 25 hydroxy; Future  -     Zinc; Future    Colon cancer screening  -     Ambulatory referral to Gastroenterology; Future    Class 1 obesity due to excess calories with serious comorbidity and body mass index (BMI) of 30.0 to 30.9 in adult  -     Ambulatory Referral to Weight Management  -     Semaglutide-Weight Management (WEGOVY) 0.25 MG/0.5ML; Inject 0.5 mL (0.25 mg total) under the skin once a week for 28 days  -     ECG 12 lead; Future    Excessive hunger  -     Semaglutide-Weight Management (WEGOVY) 0.25 MG/0.5ML; Inject 0.5 mL (0.25 mg total) under the skin once a week for 28 days    Overweight  -     Hemoglobin A1C; Future  -     TSH, 3rd generation with Free T4 reflex; Future  -     Lipid panel; Future          Subjective:      Patient ID: Farzaneh James is a 44 y.o. female.    -s/p Vertical Sleeve Gastrectomy at [facility] in 2019. Presents to the office today to establish care and for concerns of weight regain. Tolerating diet without issues; denies N/V, dysphagia. Daily BM's.    She lost 75lbs s/p surgery and was maintaining her weight around 155lbs until 2 years ago when her GLENN  and mother in law became sick and high stress - she is now up 18lbs from her micaela and ready to get back down into 150's.    Has 3 kids - 13, 16, 15. Works as  7th grade.    Diet Recall:   B - ham and cheese and whole grain bread and 12oz coffee with cream and collagen  Snack - cheese  L -  "chicken and black bean and cheese enchilladas   Snack - pretzels and chips  D - black bean soup and chicken or fish     Fluids - 12-24oz coffee, 64oz water, 2-3 glasses wine several nights a week    The following portions of the patient's history were reviewed and updated as appropriate: allergies, current medications, past family history, past medical history, past social history, past surgical history and problem list.    Review of Systems   Constitutional:  Positive for fatigue and unexpected weight change (weight regain). Negative for chills and fever.   HENT:  Negative for trouble swallowing.    Respiratory:  Negative for cough and shortness of breath.    Cardiovascular:  Negative for chest pain and palpitations.   Gastrointestinal:  Negative for abdominal pain, constipation, diarrhea, nausea and vomiting.   Neurological:  Negative for dizziness.   Psychiatric/Behavioral:          High stress         Objective:      /72 (BP Location: Left arm, Patient Position: Sitting, Cuff Size: Large)   Pulse 90   Resp 16   Ht 5' 3.58\" (1.615 m)   Wt 76.2 kg (168 lb)   BMI 29.22 kg/m²     Colonoscopy-referral placed to GI - patient just turned 44       Physical Exam  Vitals reviewed.   Constitutional:       General: She is not in acute distress.     Appearance: She is well-developed.   HENT:      Head: Normocephalic and atraumatic.   Eyes:      General: No scleral icterus.  Cardiovascular:      Rate and Rhythm: Normal rate and regular rhythm.   Pulmonary:      Effort: Pulmonary effort is normal. No respiratory distress.   Abdominal:      General: There is no distension.      Palpations: Abdomen is soft.   Skin:     General: Skin is warm and dry.   Neurological:      Mental Status: She is alert.   Psychiatric:         Mood and Affect: Mood normal.         Behavior: Behavior normal.           BARRIERS: none identified    GOALS:   Continued/Maintain healthy weight loss with good nutrition intakes.  Adequate hydration " with at least 64oz. fluid intake.  Normal vitamin and mineral levels.  Exercise as tolerated.    Follow-up in 3 months. We kindly ask that your arrive 15 minutes before your scheduled appointment time with your provider to allow our staff to room you, get your vital signs and update your chart.    Follow diet as discussed.      Get lab work done in the next 2 weeks.  You have been given a lab slip today.  Please call the office if you need a replacement.  It is recommended to check with your insurance BEFORE getting labs done to make sure they are covered by your policy.  Also, please check with your PCP and other providers before getting labs to avoid duplicate labs. Make sure to HOLD any multivitamins that may contain biotin and any biotin supplements FOR 5 DAYS before any labs since it can affect the results.    Follow vitamin and mineral recommendations as reviewed with you.    Call our office if you have any problems with abdominal pain especially associated with fever, chills, nausea, vomiting or any other concerns.    All  Post-bariatric surgery patients should be aware that very small quantities of any alcohol can cause impairment and it is very possible not to feel the effect. The effect can be in the system for several hours.  It is also a stomach irritant.     It is advised to AVOID alcohol, Nonsteroidal antiinflammatory drugs (NSAIDS) and nicotine of all forms . Any of these can cause stomach irritation/pain.

## 2024-11-22 ENCOUNTER — TELEPHONE (OUTPATIENT)
Age: 44
End: 2024-11-22

## 2024-11-22 NOTE — TELEPHONE ENCOUNTER
Spoke to patient and informed her she needs to get ECG prior to any other medication being prescribed for her.  She stated she's going tomorrow to get that done.

## 2024-11-22 NOTE — TELEPHONE ENCOUNTER
Patient of Talya Hoffman PA-C. Was seen in the office 11/13/24 and prescribed Wegovy 0.25mg. The out of pocket cost is too high. Patient would then interested in perusing the Phentermine.

## 2024-11-30 ENCOUNTER — OFFICE VISIT (OUTPATIENT)
Dept: LAB | Facility: HOSPITAL | Age: 44
End: 2024-11-30
Payer: COMMERCIAL

## 2024-11-30 ENCOUNTER — APPOINTMENT (OUTPATIENT)
Dept: LAB | Facility: HOSPITAL | Age: 44
End: 2024-11-30
Payer: COMMERCIAL

## 2024-11-30 DIAGNOSIS — E66.811 CLASS 1 OBESITY DUE TO EXCESS CALORIES WITH SERIOUS COMORBIDITY AND BODY MASS INDEX (BMI) OF 30.0 TO 30.9 IN ADULT: ICD-10-CM

## 2024-11-30 DIAGNOSIS — E66.3 OVERWEIGHT: ICD-10-CM

## 2024-11-30 DIAGNOSIS — E66.09 CLASS 1 OBESITY DUE TO EXCESS CALORIES WITH SERIOUS COMORBIDITY AND BODY MASS INDEX (BMI) OF 30.0 TO 30.9 IN ADULT: ICD-10-CM

## 2024-11-30 DIAGNOSIS — K91.2 POSTSURGICAL MALABSORPTION: ICD-10-CM

## 2024-11-30 LAB
ALBUMIN SERPL BCG-MCNC: 4 G/DL (ref 3.5–5)
ALP SERPL-CCNC: 59 U/L (ref 34–104)
ALT SERPL W P-5'-P-CCNC: 14 U/L (ref 7–52)
ANION GAP SERPL CALCULATED.3IONS-SCNC: 5 MMOL/L (ref 4–13)
AST SERPL W P-5'-P-CCNC: 15 U/L (ref 13–39)
BASOPHILS # BLD AUTO: 0.03 THOUSANDS/ΜL (ref 0–0.1)
BASOPHILS NFR BLD AUTO: 1 % (ref 0–1)
BILIRUB SERPL-MCNC: 0.9 MG/DL (ref 0.2–1)
BUN SERPL-MCNC: 10 MG/DL (ref 5–25)
CALCIUM SERPL-MCNC: 8.7 MG/DL (ref 8.4–10.2)
CHLORIDE SERPL-SCNC: 104 MMOL/L (ref 96–108)
CHOLEST SERPL-MCNC: 188 MG/DL (ref ?–200)
CO2 SERPL-SCNC: 29 MMOL/L (ref 21–32)
CREAT SERPL-MCNC: 0.64 MG/DL (ref 0.6–1.3)
EOSINOPHIL # BLD AUTO: 0.13 THOUSAND/ΜL (ref 0–0.61)
EOSINOPHIL NFR BLD AUTO: 3 % (ref 0–6)
ERYTHROCYTE [DISTWIDTH] IN BLOOD BY AUTOMATED COUNT: 12.3 % (ref 11.6–15.1)
EST. AVERAGE GLUCOSE BLD GHB EST-MCNC: 97 MG/DL
GFR SERPL CREATININE-BSD FRML MDRD: 108 ML/MIN/1.73SQ M
GLUCOSE P FAST SERPL-MCNC: 93 MG/DL (ref 65–99)
HBA1C MFR BLD: 5 %
HCT VFR BLD AUTO: 41.9 % (ref 34.8–46.1)
HDLC SERPL-MCNC: 112 MG/DL
HGB BLD-MCNC: 13.6 G/DL (ref 11.5–15.4)
IMM GRANULOCYTES # BLD AUTO: 0.01 THOUSAND/UL (ref 0–0.2)
IMM GRANULOCYTES NFR BLD AUTO: 0 % (ref 0–2)
LDLC SERPL CALC-MCNC: 63 MG/DL (ref 0–100)
LYMPHOCYTES # BLD AUTO: 1.23 THOUSANDS/ΜL (ref 0.6–4.47)
LYMPHOCYTES NFR BLD AUTO: 24 % (ref 14–44)
MCH RBC QN AUTO: 31.5 PG (ref 26.8–34.3)
MCHC RBC AUTO-ENTMCNC: 32.5 G/DL (ref 31.4–37.4)
MCV RBC AUTO: 97 FL (ref 82–98)
MONOCYTES # BLD AUTO: 0.33 THOUSAND/ΜL (ref 0.17–1.22)
MONOCYTES NFR BLD AUTO: 7 % (ref 4–12)
NEUTROPHILS # BLD AUTO: 3.38 THOUSANDS/ΜL (ref 1.85–7.62)
NEUTS SEG NFR BLD AUTO: 65 % (ref 43–75)
NONHDLC SERPL-MCNC: 76 MG/DL
NRBC BLD AUTO-RTO: 0 /100 WBCS
PLATELET # BLD AUTO: 241 THOUSANDS/UL (ref 149–390)
PMV BLD AUTO: 9.9 FL (ref 8.9–12.7)
POTASSIUM SERPL-SCNC: 4 MMOL/L (ref 3.5–5.3)
PROT SERPL-MCNC: 6.3 G/DL (ref 6.4–8.4)
RBC # BLD AUTO: 4.32 MILLION/UL (ref 3.81–5.12)
SODIUM SERPL-SCNC: 138 MMOL/L (ref 135–147)
TRIGL SERPL-MCNC: 67 MG/DL (ref ?–150)
TSH SERPL DL<=0.05 MIU/L-ACNC: 1.99 UIU/ML (ref 0.45–4.5)
WBC # BLD AUTO: 5.11 THOUSAND/UL (ref 4.31–10.16)

## 2024-11-30 PROCEDURE — 83540 ASSAY OF IRON: CPT

## 2024-11-30 PROCEDURE — 85025 COMPLETE CBC W/AUTO DIFF WBC: CPT

## 2024-11-30 PROCEDURE — 84443 ASSAY THYROID STIM HORMONE: CPT

## 2024-11-30 PROCEDURE — 84630 ASSAY OF ZINC: CPT

## 2024-11-30 PROCEDURE — 83970 ASSAY OF PARATHORMONE: CPT

## 2024-11-30 PROCEDURE — 82746 ASSAY OF FOLIC ACID SERUM: CPT

## 2024-11-30 PROCEDURE — 84590 ASSAY OF VITAMIN A: CPT

## 2024-11-30 PROCEDURE — 80061 LIPID PANEL: CPT

## 2024-11-30 PROCEDURE — 82306 VITAMIN D 25 HYDROXY: CPT

## 2024-11-30 PROCEDURE — 82728 ASSAY OF FERRITIN: CPT

## 2024-11-30 PROCEDURE — 82607 VITAMIN B-12: CPT

## 2024-11-30 PROCEDURE — 84425 ASSAY OF VITAMIN B-1: CPT

## 2024-11-30 PROCEDURE — 80053 COMPREHEN METABOLIC PANEL: CPT

## 2024-11-30 PROCEDURE — 36415 COLL VENOUS BLD VENIPUNCTURE: CPT

## 2024-11-30 PROCEDURE — 93005 ELECTROCARDIOGRAM TRACING: CPT

## 2024-11-30 PROCEDURE — 83550 IRON BINDING TEST: CPT

## 2024-11-30 PROCEDURE — 83036 HEMOGLOBIN GLYCOSYLATED A1C: CPT

## 2024-12-01 LAB
25(OH)D3 SERPL-MCNC: 25.7 NG/ML (ref 30–100)
ATRIAL RATE: 65 BPM
FERRITIN SERPL-MCNC: 18 NG/ML (ref 11–307)
FOLATE SERPL-MCNC: 10.5 NG/ML
IRON SATN MFR SERPL: 50 % (ref 15–50)
IRON SERPL-MCNC: 201 UG/DL (ref 50–212)
P AXIS: 38 DEGREES
PR INTERVAL: 182 MS
PTH-INTACT SERPL-MCNC: 46.6 PG/ML (ref 12–88)
QRS AXIS: 46 DEGREES
QRSD INTERVAL: 76 MS
QT INTERVAL: 390 MS
QTC INTERVAL: 406 MS
T WAVE AXIS: 19 DEGREES
TIBC SERPL-MCNC: 399 UG/DL (ref 250–450)
UIBC SERPL-MCNC: 198 UG/DL (ref 155–355)
VENTRICULAR RATE: 65 BPM
VIT B12 SERPL-MCNC: 99 PG/ML (ref 180–914)

## 2024-12-01 PROCEDURE — 93010 ELECTROCARDIOGRAM REPORT: CPT | Performed by: STUDENT IN AN ORGANIZED HEALTH CARE EDUCATION/TRAINING PROGRAM

## 2024-12-02 ENCOUNTER — RESULTS FOLLOW-UP (OUTPATIENT)
Dept: BARIATRICS | Facility: CLINIC | Age: 44
End: 2024-12-02

## 2024-12-02 DIAGNOSIS — E55.9 VITAMIN D INSUFFICIENCY: ICD-10-CM

## 2024-12-02 DIAGNOSIS — K91.2 POSTSURGICAL MALABSORPTION: Primary | ICD-10-CM

## 2024-12-02 DIAGNOSIS — R79.0 LOW FERRITIN: ICD-10-CM

## 2024-12-02 DIAGNOSIS — E53.8 VITAMIN B12 DEFICIENCY: ICD-10-CM

## 2024-12-02 LAB — ZINC SERPL-MCNC: 79 UG/DL (ref 44–115)

## 2024-12-02 RX ORDER — ERGOCALCIFEROL 1.25 MG/1
50000 CAPSULE, LIQUID FILLED ORAL WEEKLY
Qty: 6 CAPSULE | Refills: 0 | Status: SHIPPED | OUTPATIENT
Start: 2024-12-02

## 2024-12-02 RX ORDER — CYANOCOBALAMIN 1000 UG/ML
1000 INJECTION, SOLUTION INTRAMUSCULAR; SUBCUTANEOUS WEEKLY
Status: SHIPPED | OUTPATIENT
Start: 2024-12-02 | End: 2024-12-30

## 2024-12-02 NOTE — RESULT ENCOUNTER NOTE
Please let patient know about her labs, thank you:    -You have a vitamin D deficiency. Please start taking the vitamin D deficiency prescription that I am sending for 50,000IU weekly with FOOD x 6 weeks - take with food for best absorption. We will repeat vitamin D lab in about 4 months.      -Vitamin B12 is still low - Did she do injections 4 months ago!? Please take an additional 2,000mcg sublingual B12 daily x 3 months. This can be found inexpensively OTC. I also recommend 1,000mcg IM B12 shots in the office; once weekly x 4 doses.    -Ferritin low - is she taking Justino MVI with 45mg iron - if so then also start 1 Vitron C daily - repeat in 3 months

## 2024-12-05 LAB — VIT B1 BLD-SCNC: 121.6 NMOL/L (ref 66.5–200)

## 2024-12-06 ENCOUNTER — CLINICAL SUPPORT (OUTPATIENT)
Dept: INTERNAL MEDICINE CLINIC | Facility: CLINIC | Age: 44
End: 2024-12-06
Payer: COMMERCIAL

## 2024-12-06 DIAGNOSIS — E53.8 B12 DEFICIENCY: Primary | ICD-10-CM

## 2024-12-06 LAB — VIT A SERPL-MCNC: 49.6 UG/DL (ref 20.1–62)

## 2024-12-06 PROCEDURE — 96372 THER/PROPH/DIAG INJ SC/IM: CPT

## 2024-12-06 RX ADMIN — CYANOCOBALAMIN 1000 MCG: 1000 INJECTION, SOLUTION INTRAMUSCULAR; SUBCUTANEOUS at 15:15

## 2024-12-12 ENCOUNTER — CLINICAL SUPPORT (OUTPATIENT)
Dept: INTERNAL MEDICINE CLINIC | Facility: CLINIC | Age: 44
End: 2024-12-12
Payer: COMMERCIAL

## 2024-12-12 DIAGNOSIS — E53.8 B12 DEFICIENCY: Primary | ICD-10-CM

## 2024-12-12 PROCEDURE — 96372 THER/PROPH/DIAG INJ SC/IM: CPT

## 2024-12-18 ENCOUNTER — TELEPHONE (OUTPATIENT)
Age: 44
End: 2024-12-18

## 2024-12-18 NOTE — TELEPHONE ENCOUNTER
PT called in asking if she can have Vitamin B12 shot during nurse visit on 12/23/24. Reports that it will be her 3rd out of 4 injections.     Call back: [phone]

## 2024-12-23 ENCOUNTER — CLINICAL SUPPORT (OUTPATIENT)
Dept: BARIATRICS | Facility: CLINIC | Age: 44
End: 2024-12-23

## 2024-12-23 VITALS
HEIGHT: 64 IN | RESPIRATION RATE: 12 BRPM | HEART RATE: 72 BPM | DIASTOLIC BLOOD PRESSURE: 74 MMHG | BODY MASS INDEX: 27.14 KG/M2 | SYSTOLIC BLOOD PRESSURE: 106 MMHG | WEIGHT: 159 LBS

## 2024-12-23 DIAGNOSIS — R63.5 ABNORMAL WEIGHT GAIN: Primary | ICD-10-CM

## 2024-12-23 PROCEDURE — RECHECK

## 2024-12-23 RX ADMIN — CYANOCOBALAMIN 1000 MCG: 1000 INJECTION, SOLUTION INTRAMUSCULAR; SUBCUTANEOUS at 10:12

## 2024-12-30 DIAGNOSIS — E66.09 CLASS 1 OBESITY DUE TO EXCESS CALORIES WITH SERIOUS COMORBIDITY AND BODY MASS INDEX (BMI) OF 30.0 TO 30.9 IN ADULT: ICD-10-CM

## 2024-12-30 DIAGNOSIS — E66.811 CLASS 1 OBESITY DUE TO EXCESS CALORIES WITH SERIOUS COMORBIDITY AND BODY MASS INDEX (BMI) OF 30.0 TO 30.9 IN ADULT: ICD-10-CM

## 2024-12-30 DIAGNOSIS — R63.2 EXCESSIVE HUNGER: Primary | ICD-10-CM

## 2025-01-27 ENCOUNTER — TELEPHONE (OUTPATIENT)
Age: 45
End: 2025-01-27

## 2025-01-27 DIAGNOSIS — E66.3 OVERWEIGHT: ICD-10-CM

## 2025-01-27 DIAGNOSIS — R63.5 ABNORMAL WEIGHT GAIN: Primary | ICD-10-CM

## 2025-01-27 RX ORDER — PHENTERMINE HYDROCHLORIDE 15 MG/1
15 CAPSULE ORAL EVERY MORNING
Qty: 30 CAPSULE | Refills: 0 | Status: SHIPPED | OUTPATIENT
Start: 2025-01-27

## 2025-01-27 NOTE — TELEPHONE ENCOUNTER
Spoke to patient and informed her that Phentermine was sent to her pharmacy, but not to take it until she's been off Wegovy at least a week and is feeling completely better.  Patient verbalized understanding.

## 2025-01-27 NOTE — TELEPHONE ENCOUNTER
Patient of Talya calling about her Wegovy.  Patient stated she is going to discontinue as she has not been able to tolerate the nausea from the medication.    She had to cancel her nurse visit on 1/21, wondering if it is still necessary prior to her 3 month follow up on 2/26.    Patient would like to start on the phentermine as discussed at her office visit. She had her ECG completed on 11/30/24.    Uses  [id number] - [place] - [address]  [address] [place] [facility] [facility] [address]  Phone: [phone]  Fax: [phone]       Please advise  Patient agreeable to [employer] message  #[phone]

## 2025-04-11 ENCOUNTER — OFFICE VISIT (OUTPATIENT)
Dept: BARIATRICS | Facility: CLINIC | Age: 45
End: 2025-04-11

## 2025-04-11 VITALS
HEIGHT: 64 IN | SYSTOLIC BLOOD PRESSURE: 110 MMHG | BODY MASS INDEX: 28.07 KG/M2 | HEART RATE: 79 BPM | WEIGHT: 164.4 LBS | DIASTOLIC BLOOD PRESSURE: 80 MMHG

## 2025-04-11 DIAGNOSIS — R63.5 ABNORMAL WEIGHT GAIN: ICD-10-CM

## 2025-04-11 DIAGNOSIS — E66.3 OVERWEIGHT: Primary | ICD-10-CM

## 2025-04-11 DIAGNOSIS — E66.09 CLASS 1 OBESITY DUE TO EXCESS CALORIES WITH SERIOUS COMORBIDITY AND BODY MASS INDEX (BMI) OF 30.0 TO 30.9 IN ADULT: ICD-10-CM

## 2025-04-11 DIAGNOSIS — G47.33 OSA (OBSTRUCTIVE SLEEP APNEA): ICD-10-CM

## 2025-04-11 DIAGNOSIS — E66.811 CLASS 1 OBESITY DUE TO EXCESS CALORIES WITH SERIOUS COMORBIDITY AND BODY MASS INDEX (BMI) OF 30.0 TO 30.9 IN ADULT: ICD-10-CM

## 2025-04-11 RX ORDER — PHENTERMINE HYDROCHLORIDE 15 MG/1
15 CAPSULE ORAL EVERY MORNING
Qty: 30 CAPSULE | Refills: 0 | Status: SHIPPED | OUTPATIENT
Start: 2025-04-11

## 2025-04-11 NOTE — ASSESSMENT & PLAN NOTE
- Follow-up in 1 month for vitals check nurse visit.  - Discussed options of HealthyCORE-Intensive Lifestyle Intervention Program, Very Low Calorie Diet-VLCD, and Conservative Program and the role of weight loss medications.  - Patient is interested in pursuing Conservative Program and follow up visits with medical weight management provider.  - Explained the importance of making lifestyle changes in addition to starting any anti-obesity medications.   - Initial weight loss goal of 5-10% weight loss for improved health. Weight loss can improve patient's co-morbid conditions and/or prevent weight-related complications.  - Weight is not at goal and patient has been unable to achieve a meaningful weight loss above 5% using various programs and tools for more than 6 months  - Labs reviewed.    General Recommendations:  Nutrition:  Eat breakfast daily.  Do not skip meals.      Food log (ie.) www.be2.com, sparkpeople.com, loseit.com, calorieking.com, etc.     Practice mindful eating.  Be sure to set aside time to eat, eat slowly, and savor your food.     Hydration:    At least 64oz of water daily.  No sugar sweetened beverages.  No juice (eat the fruit instead).     Exercise:  Studies have shown that the ideal exercise goal is somewhere between 150 to 300 minutes of moderate intensity exercise a week.  Start with exercising 10 minutes every other day and gradually increase physical activity with a goal of at least 150 minutes of moderate intensity exercise a week, divided over at least 3 days a week.  An example of this would be exercising 30 minutes a day, 5 days a week.  Resistance training can increase muscle mass and increase our resting metabolic rate.   FULL BODY resistance training is recommended 2-3 times a week.  Do not do this on consecutive days to allow for muscle recovery.     Aim for a bare minimum 5000 steps, even on days you do not exercise.     Monitoring:   Weigh yourself daily.  If this causes  undue stress, then just weigh yourself once a week.  Weigh yourself the same time of the day with the same amount of clothing on.  Preferably this should be done after waking up, before you eat, and with no clothing or minimal clothing on.     Phentermine Instructions:    -Take medication once daily in the morning.  -This is a controlled substance and you will need to be monitored while on this medication which will require office visits every 6-8 weeks.  -Your blood pressure should not be 140/90 or higher and pulse should be between  bpm (beats per minute).  Notify the provider if either are consistently elevated while on this medication.  -If you are female and can get pregnant please be aware this medication can be harmful to a fetus and therefore you should have contraception methods in place.    Phentermine has as potential side effects of increased heart rate, increased blood pressure, palpitations, headache, dizziness, insomnia, altered mood, abdominal upset, and dry mouth. Notify the provider with change in mood. Please go to the closest ER if any suicidal/homicidal thoughts occur . Phentermine should be stopped 4 days prior to surgery. Notify the provider with any changes in vision. It is contraindicated in pregnancy and lactation period due to potential harm to the fetus.    Patient also denies pregnancy. Expresses understanding that medication is not safe in pregnancy and will discontinue in the event of pregnancy. Patient demonstrates full understanding verbally. All questions answered.

## 2025-04-11 NOTE — PATIENT INSTRUCTIONS
- Discussed options of HealthyCORE-Intensive Lifestyle Intervention Program, Very Low Calorie Diet-VLCD, and Conservative Program and the role of weight loss medications.  - Patient is interested in pursuing Conservative Program and follow up visits with medical weight management provider.  - Explained the importance of making lifestyle changes in addition to starting any anti-obesity medications.   - Initial weight loss goal of 5-10% weight loss for improved health. Weight loss can improve patient's co-morbid conditions and/or prevent weight-related complications.  - Weight is not at goal and patient has been unable to achieve a meaningful weight loss above 5% using various programs and tools for more than 6 months  - Labs reviewed.    General Recommendations:  Nutrition:  Eat breakfast daily.  Do not skip meals.      Food log (ie.) www.Risk I/O.com, sparkpeople.com, loseit.com, calorieking.com, etc.     Practice mindful eating.  Be sure to set aside time to eat, eat slowly, and savor your food.     Hydration:    At least 64oz of water daily.  No sugar sweetened beverages.  No juice (eat the fruit instead).     Exercise:  Studies have shown that the ideal exercise goal is somewhere between 150 to 300 minutes of moderate intensity exercise a week.  Start with exercising 10 minutes every other day and gradually increase physical activity with a goal of at least 150 minutes of moderate intensity exercise a week, divided over at least 3 days a week.  An example of this would be exercising 30 minutes a day, 5 days a week.  Resistance training can increase muscle mass and increase our resting metabolic rate.   FULL BODY resistance training is recommended 2-3 times a week.  Do not do this on consecutive days to allow for muscle recovery.     Aim for a bare minimum 5000 steps, even on days you do not exercise.     Monitoring:   Weigh yourself daily.  If this causes undue stress, then just weigh yourself once a week.   Weigh yourself the same time of the day with the same amount of clothing on.  Preferably this should be done after waking up, before you eat, and with no clothing or minimal clothing on.     Phentermine Instructions:    -Take medication once daily in the morning.  -This is a controlled substance and you will need to be monitored while on this medication which will require office visits every 6-8 weeks.  -Your blood pressure should not be 140/90 or higher and pulse should be between  bpm (beats per minute).  Notify the provider if either are consistently elevated while on this medication.  -If you are female and can get pregnant please be aware this medication can be harmful to a fetus and therefore you should have contraception methods in place.    Phentermine has as potential side effects of increased heart rate, increased blood pressure, palpitations, headache, dizziness, insomnia, altered mood, abdominal upset, and dry mouth. Notify the provider with change in mood. Please go to the closest ER if any suicidal/homicidal thoughts occur . Phentermine should be stopped 4 days prior to surgery. Notify the provider with any changes in vision. It is contraindicated in pregnancy and lactation period due to potential harm to the fetus.    Patient also denies pregnancy. Expresses understanding that medication is not safe in pregnancy and will discontinue in the event of pregnancy. Patient demonstrates full understanding verbally. All questions answered.

## 2025-04-11 NOTE — PROGRESS NOTES
Assessment/Plan:     Overweight  - Discussed options of HealthyCORE-Intensive Lifestyle Intervention Program, Very Low Calorie Diet-VLCD, and Conservative Program and the role of weight loss medications.  - Patient is interested in pursuing Conservative Program and follow up visits with medical weight management provider.  - Explained the importance of making lifestyle changes in addition to starting any anti-obesity medications.   - Initial weight loss goal of 5-10% weight loss for improved health. Weight loss can improve patient's co-morbid conditions and/or prevent weight-related complications.  - Weight is not at goal and patient has been unable to achieve a meaningful weight loss above 5% using various programs and tools for more than 6 months  - Labs reviewed.    General Recommendations:  Nutrition:  Eat breakfast daily.  Do not skip meals.      Food log (ie.) www.myfitnesspal.com, sparkpeople.com, loseit.com, calorieking.com, etc.     Practice mindful eating.  Be sure to set aside time to eat, eat slowly, and savor your food.     Hydration:    At least 64oz of water daily.  No sugar sweetened beverages.  No juice (eat the fruit instead).     Exercise:  Studies have shown that the ideal exercise goal is somewhere between 150 to 300 minutes of moderate intensity exercise a week.  Start with exercising 10 minutes every other day and gradually increase physical activity with a goal of at least 150 minutes of moderate intensity exercise a week, divided over at least 3 days a week.  An example of this would be exercising 30 minutes a day, 5 days a week.  Resistance training can increase muscle mass and increase our resting metabolic rate.   FULL BODY resistance training is recommended 2-3 times a week.  Do not do this on consecutive days to allow for muscle recovery.     Aim for a bare minimum 5000 steps, even on days you do not exercise.     Monitoring:   Weigh yourself daily.  If this causes undue stress, then  just weigh yourself once a week.  Weigh yourself the same time of the day with the same amount of clothing on.  Preferably this should be done after waking up, before you eat, and with no clothing or minimal clothing on.     Phentermine Instructions:    -Take medication once daily in the morning.  -This is a controlled substance and you will need to be monitored while on this medication which will require office visits every 6-8 weeks.  -Your blood pressure should not be 140/90 or higher and pulse should be between  bpm (beats per minute).  Notify the provider if either are consistently elevated while on this medication.  -If you are female and can get pregnant please be aware this medication can be harmful to a fetus and therefore you should have contraception methods in place.    Phentermine has as potential side effects of increased heart rate, increased blood pressure, palpitations, headache, dizziness, insomnia, altered mood, abdominal upset, and dry mouth. Notify the provider with change in mood. Please go to the closest ER if any suicidal/homicidal thoughts occur . Phentermine should be stopped 4 days prior to surgery. Notify the provider with any changes in vision. It is contraindicated in pregnancy and lactation period due to potential harm to the fetus.    Patient also denies pregnancy. Expresses understanding that medication is not safe in pregnancy and will discontinue in the event of pregnancy. Patient demonstrates full understanding verbally. All questions answered.              Farzaneh was seen today for follow-up.    Diagnoses and all orders for this visit:    Overweight  -     phentermine 15 MG capsule; Take 1 capsule (15 mg total) by mouth every morning    OLGA (obstructive sleep apnea)    Class 1 obesity due to excess calories with serious comorbidity and body mass index (BMI) of 30.0 to 30.9 in adult    Abnormal weight gain  -     phentermine 15 MG capsule; Take 1 capsule (15 mg total) by  "mouth every morning        Total time spent reviewing chart, interviewing patient, examining patient, discussing plan, answering all questions, and documentin minutes with >50% face-to-face time with the patient.    Follow up in approximately 3 months with Non-Surgical Physician/Advanced Practitioner.    Subjective:   Chief Complaint   Patient presents with    Follow-up     Pt is here for MWM f/u. Waist - 35.5\"       Patient ID: Farzaneh James  is a 44 y.o. female with excess weight/obesity here to pursue weight management.  Patient is pursuing Conservative Program.   Most recent notes and records were reviewed.    HPI    Wt Readings from Last 20 Encounters:   25 74.6 kg (164 lb 6.4 oz)   24 72.1 kg (159 lb)   24 76.2 kg (168 lb)   24 75.8 kg (167 lb)   24 75.8 kg (167 lb)   23 75 kg (165 lb 5.5 oz)   22 74.3 kg (163 lb 12.8 oz)   22 75.3 kg (166 lb)   22 72.6 kg (160 lb)   10/16/20 72.1 kg (159 lb)   20 70.8 kg (156 lb)   20 71.2 kg (157 lb)   20 69.9 kg (154 lb)   20 69.9 kg (154 lb)   20 70.8 kg (156 lb)   18 110 kg (243 lb 3.2 oz)   18 108 kg (238 lb)   17 103 kg (226 lb)   10/30/17 104 kg (230 lb)   10/12/17 99.8 kg (220 lb)       Patient presents today to medical weight management office for follow up.    Initial: 224lbs  Current: 164 lbs (2025)  Owen: 150lbs  Current BMI is Body mass index is 28.67 (2025)        Has 3 kids - 13, 16, 15. Works as  7th grade.     Diet Recall:   B - ham and cheese and whole grain bread and 12oz coffee with cream and collagen  Snack - cheese  L - chicken and black bean and cheese enchilladas   Snack - pretzels and chips  D - black bean soup and chicken or fish      Fluids - 12-24oz coffee, 64oz water, 2-3 glasses wine several nights a week      The following portions of the patient's history were reviewed and updated as appropriate: allergies, " current medications, past family history, past medical history, past social history, past surgical history, and problem list.    Family History   Problem Relation Age of Onset    Anxiety disorder Mother     Depression Mother     Uterine cancer Mother 56    Hypertension Mother     Prostate cancer Father     No Known Problems Daughter     No Known Problems Daughter     Osteoporosis Maternal Grandmother     Heart disease Maternal Grandmother     Stroke Maternal Grandmother     Anxiety disorder Maternal Grandfather     Alcohol abuse Maternal Grandfather     Bipolar disorder Maternal Grandfather     Depression Maternal Grandfather     Heart disease Maternal Grandfather     Lung cancer Maternal Grandfather 70    Breast cancer Paternal Grandmother 50    Alcohol abuse Paternal Grandfather     Lung cancer Paternal Grandfather     Depression Maternal Aunt     Bipolar disorder Maternal Aunt     Anxiety disorder Maternal Aunt     Other Maternal Aunt         epilepsy     Aneurysm Maternal Aunt         brain    No Known Problems Maternal Aunt     No Known Problems Maternal Aunt     Schizophrenia Maternal Uncle     Thyroid disease Paternal Aunt     No Known Problems Paternal Aunt     No Known Problems Paternal Aunt     No Known Problems Paternal Aunt     Thyroid disease Cousin     Diabetes Neg Hx     Thyroid cancer Neg Hx     Colon cancer Neg Hx     Ovarian cancer Neg Hx     Cervical cancer Neg Hx         Review of Systems   Constitutional:  Negative for chills and fever.   HENT:  Negative for ear pain and sore throat.    Eyes:  Negative for pain and visual disturbance.   Respiratory:  Negative for cough and shortness of breath.    Cardiovascular:  Negative for chest pain and palpitations.   Gastrointestinal:  Negative for abdominal pain and vomiting.   Genitourinary:  Negative for dysuria and hematuria.   Musculoskeletal:  Negative for arthralgias and back pain.   Skin:  Negative for color change and rash.   Neurological:   "Negative for seizures and syncope.   All other systems reviewed and are negative.      Objective:  /80 (Patient Position: Sitting, Cuff Size: Large)   Pulse 79   Ht 5' 3.5\" (1.613 m)   Wt 74.6 kg (164 lb 6.4 oz)   LMP 04/04/2025 (Exact Date)   BMI 28.67 kg/m²     Physical Exam  Constitutional:       General: She is not in acute distress.     Appearance: Normal appearance. She is obese. She is not ill-appearing, toxic-appearing or diaphoretic.   HENT:      Head: Normocephalic and atraumatic.   Pulmonary:      Effort: Pulmonary effort is normal.   Musculoskeletal:         General: Normal range of motion.      Cervical back: Normal range of motion.   Skin:     General: Skin is warm.   Neurological:      Mental Status: She is alert and oriented to person, place, and time.   Psychiatric:         Mood and Affect: Mood normal.         Behavior: Behavior normal.            Labs   Most recent labs reviewed   Lab Results   Component Value Date    SODIUM 138 11/30/2024    K 4.0 11/30/2024     11/30/2024    CO2 29 11/30/2024    AGAP 5 11/30/2024    BUN 10 11/30/2024    CREATININE 0.64 11/30/2024    GLUC 80 09/11/2018    GLUF 93 11/30/2024    CALCIUM 8.7 11/30/2024    AST 15 11/30/2024    ALT 14 11/30/2024    ALKPHOS 59 11/30/2024    TP 6.3 (L) 11/30/2024    TBILI 0.90 11/30/2024    EGFR 108 11/30/2024     Lab Results   Component Value Date    HGBA1C 5.0 11/30/2024     Lab Results   Component Value Date    HGA4SSMUGIUA 1.989 11/30/2024    TSH 2.04 09/11/2018     Lab Results   Component Value Date    CHOLESTEROL 188 11/30/2024     Lab Results   Component Value Date     11/30/2024     Lab Results   Component Value Date    TRIG 67 11/30/2024     Lab Results   Component Value Date    LDLCALC 63 11/30/2024           Weight Management  Agustin Mejia PA-C    "

## 2025-06-24 ENCOUNTER — VBI (OUTPATIENT)
Dept: ADMINISTRATIVE | Facility: OTHER | Age: 45
End: 2025-06-24

## 2025-06-24 NOTE — TELEPHONE ENCOUNTER
06/24/25 1:36 PM     Chart reviewed for CRC: Colonoscopy was/were not submitted to the patient's insurance.     Chelo Mcgarry MA   [facility]

## 2025-06-30 ENCOUNTER — PREP FOR PROCEDURE (OUTPATIENT)
Age: 45
End: 2025-06-30

## 2025-06-30 ENCOUNTER — TELEPHONE (OUTPATIENT)
Age: 45
End: 2025-06-30

## 2025-06-30 DIAGNOSIS — Z12.11 SCREENING FOR COLON CANCER: Primary | ICD-10-CM

## 2025-06-30 NOTE — TELEPHONE ENCOUNTER
06/30/25  Screened by: Janene Bonilla MA    Referring Provider Talya Hoffman    Pre- Screening:     There is no height or weight on file to calculate BMI.  Has patient been referred for a routine screening Colonoscopy? yes  Is the patient between 45-75 years old? yes      Previous Colonoscopy no   If yes:    Date:     Facility:     Reason:       SCHEDULING STAFF: If the patient is between 45yrs-49yrs, please advise patient to confirm benefits/coverage with their insurance company for a routine screening colonoscopy, some insurance carriers will only cover at 50yrs or older. If the patient is over 75years old, please schedule an office visit.     Does the patient want to see a Gastroenterologist prior to their procedure OR are they having any GI symptoms? no    Has the patient been hospitalized or had abdominal surgery in the past 6 months? no    Does the patient use supplemental oxygen? no    Does the patient take Coumadin, Lovenox, Plavix, Elliquis, Xarelto, or other blood thinning medication? no    Has the patient had a stroke, cardiac event, or stent placed in the past year? no    SCHEDULING STAFF: If patient answers NO to above questions, then schedule procedure. If patient answers YES to above questions, then schedule office appointment.     If patient is between 45yrs - 49yrs, please advise patient that we will have to confirm benefits & coverage with their insurance company for a routine screening colonoscopy.

## 2025-06-30 NOTE — TELEPHONE ENCOUNTER
Scheduled date of colonoscopy (as of today): 8-  Physician performing colonoscopy: Dr. Shukla  Location of colonoscopy: [facility]   Bowel prep reviewed with patient: miralax dulcolax sent via email: [email or web address]  Instructions reviewed with patient by: JULES   Clearances: n/a